# Patient Record
Sex: FEMALE | Race: WHITE | NOT HISPANIC OR LATINO | Employment: OTHER | URBAN - METROPOLITAN AREA
[De-identification: names, ages, dates, MRNs, and addresses within clinical notes are randomized per-mention and may not be internally consistent; named-entity substitution may affect disease eponyms.]

---

## 2018-09-10 PROBLEM — H81.10 BENIGN POSITIONAL VERTIGO: Status: ACTIVE | Noted: 2018-09-10

## 2020-03-11 ENCOUNTER — HOSPITAL ENCOUNTER (EMERGENCY)
Facility: HOSPITAL | Age: 85
Discharge: HOME | End: 2020-03-11
Attending: NEUROLOGICAL SURGERY | Admitting: NEUROLOGICAL SURGERY
Payer: MEDICARE

## 2020-03-11 VITALS
HEART RATE: 67 BPM | TEMPERATURE: 98 F | SYSTOLIC BLOOD PRESSURE: 135 MMHG | WEIGHT: 175 LBS | OXYGEN SATURATION: 100 % | BODY MASS INDEX: 34.36 KG/M2 | DIASTOLIC BLOOD PRESSURE: 81 MMHG | HEIGHT: 60 IN | RESPIRATION RATE: 20 BRPM

## 2020-03-11 DIAGNOSIS — S09.90XA TRAUMATIC INJURY OF HEAD, INITIAL ENCOUNTER: ICD-10-CM

## 2020-03-11 DIAGNOSIS — I62.00 SUBDURAL HEMORRHAGE: Primary | ICD-10-CM
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
## 2020-03-11 PROBLEM — S06.5XAA SUBDURAL HEMATOMA: Status: ACTIVE | Noted: 2020-03-11

## 2020-03-11 LAB
ABO + RH BLD: NORMAL
BLD GP AB SCN CELLS X3 SERPL QL: NORMAL

## 2020-03-11 PROCEDURE — 99285 PR EMERGENCY DEPT VISIT,LEVEL V: ICD-10-PCS | Mod: ,,, | Performed by: PHYSICIAN ASSISTANT

## 2020-03-11 PROCEDURE — 99284 EMERGENCY DEPT VISIT MOD MDM: CPT | Mod: 25

## 2020-03-11 PROCEDURE — 99285 EMERGENCY DEPT VISIT HI MDM: CPT | Mod: ,,, | Performed by: PHYSICIAN ASSISTANT

## 2020-03-11 PROCEDURE — 99283 PR EMERGENCY DEPT VISIT,LEVEL III: ICD-10-PCS | Mod: GC,,, | Performed by: NEUROLOGICAL SURGERY

## 2020-03-11 PROCEDURE — 86900 BLOOD TYPING SEROLOGIC ABO: CPT

## 2020-03-11 PROCEDURE — 99283 EMERGENCY DEPT VISIT LOW MDM: CPT | Mod: GC,,, | Performed by: NEUROLOGICAL SURGERY

## 2020-03-11 RX ORDER — HYDROCODONE BITARTRATE AND ACETAMINOPHEN 500; 5 MG/1; MG/1
TABLET ORAL
Status: DISCONTINUED | OUTPATIENT
Start: 2020-03-11 | End: 2020-03-11

## 2020-03-11 NOTE — ED NOTES
Pt left ED ambulatory and in stable condition. VSS. GCS 15. No s/s distress noted. Pt denies any pain or c/o at this time. Pt had all belongings and copy D/C instruction sheet . Pt accompanied by spouse and daughter

## 2020-03-11 NOTE — DISCHARGE INSTRUCTIONS
STOP Plavix.  See Neurosurgery in clinic in 2 weeks.    Our goal in the emergency department is to always give you outstanding care and exceptional service. You may receive a survey by mail or e-mail in the next week regarding your experience in our ED. We would greatly appreciate your completing and returning the survey. Your feedback provides us with a way to recognize our staff who give very good care and it helps us learn how to improve when your experience was below our aspiration of excellence.

## 2020-03-11 NOTE — SUBJECTIVE & OBJECTIVE
(Not in a hospital admission)    Review of patient's allergies indicates:   Allergen Reactions    Acetaminophen     Cephalosporins     Morphine     Penicillins     Percocet [oxycodone-acetaminophen]     Sulfa (sulfonamide antibiotics)     Tetracyclines     Trimethoprim        Past Medical History:   Diagnosis Date    Anxiety     CVA (cerebral vascular accident)     Hypertension      No past surgical history on file.  Family History     None        Tobacco Use    Smoking status: Not on file   Substance and Sexual Activity    Alcohol use: Not on file    Drug use: Not on file    Sexual activity: Not on file     Review of Systems  Objective:     Weight: 79.4 kg (175 lb)  Body mass index is 34.18 kg/m².  Vital Signs (Most Recent):  Temp: 97.5 °F (36.4 °C) (03/11/20 0349)  Pulse: 60 (03/11/20 0802)  Resp: 20 (03/11/20 0802)  BP: 127/60 (03/11/20 0802)  SpO2: 97 % (03/11/20 0802) Vital Signs (24h Range):  Temp:  [97.5 °F (36.4 °C)-98.5 °F (36.9 °C)] 97.5 °F (36.4 °C)  Pulse:  [58-83] 60  Resp:  [10-22] 20  SpO2:  [93 %-100 %] 97 %  BP: (115-204)/(57-97) 127/60                          Neurosurgery Physical Exam     E4 V5 M6  AOx3, mild expressive aphasia/word finding difficulties.  Able to carry a conversation with little difficulty.  PERRL, EOMI, face symmetric, facial sensation intact, tongue midline, shoulder shrug intact, palate rise symmetric  Extremities:  5/5 strength in all extremities in all muscle groups except for some pain limitation in the knee of the RLE  no pronator drift.  Some numbness in the RUE leftover from prev stroke        Significant Labs:  Recent Labs   Lab 03/11/20 0146   GLU 98      K 3.7      CO2 25   BUN 19*   CREATININE 0.80   CALCIUM 9.9     Recent Labs   Lab 03/11/20 0146   WBC 6.90   HGB 14.1   HCT 41.8        Recent Labs   Lab 03/11/20 0146   LABPT 14.6   INR 1.2*   APTT 30.8     Microbiology Results (last 7 days)     ** No results found for the last  168 hours. **        All pertinent labs from the last 24 hours have been reviewed.    Significant Diagnostics:  CT: Ct Head Without Contrast    Result Date: 3/11/2020  Redemonstration of a trace thin subdural hematoma along the left parafalcine anterior frontal lobe.  No significant mass effect or midline shift. No new hemorrhage elsewhere. No acute intraparenchymal hemorrhage or major vascular distribution infarction. Electronically signed by resident: Venancio Hernandes MD Date:    03/11/2020 Time:    04:44 Electronically signed by: Jorge Canada MD Date:    03/11/2020 Time:    04:59    Ct Head Without Contrast    Result Date: 3/10/2020  1. Evidence of a trace amount of subdural hemorrhage in the left para falcine frontal region without associated midline shift or mass effect.  No intraparenchymal hemorrhage identified.  Findings discussed with Dr. Felton at the time of interpretation. 2. High left posterior parietal scalp subgaleal hematoma.  Underlying calvarium is intact. Electronically signed by: Peter Alvarado MD Date:    03/10/2020 Time:    23:58  MRI: No results found in the last 24 hours.

## 2020-03-11 NOTE — ASSESSMENT & PLAN NOTE
83 yo woman with pmh sig for L CVA on plavix with residual mild expressive aphasia at baseline, presented to an outside hospital after a mechanical fall.  No LOC, no change in neuro exam.  CT demonstrated a small vol left sided parafalcine SDH with no significant mass effect.  Remains at neurological baseline.    -no neurosurgical intervention indicated at this time  -bleed stable on rpt imaging  -ok to discharge to home  -please have patient follow up in clinic with Dr. Neri in 2 weeks.  Hold plavix until follow up.  -Decision whether to restart plavix will be made at that time.    -will likely need social work assistance in getting back home, as  rode in ambulance with patient.

## 2020-03-11 NOTE — ED NOTES
Patient turned over to me by Dr. Jones.  Small SDH.  Pending final NS recs.  D/W NS.  Recommend DC, stop plavix, and 2 week f/up.  Evaluated at bedside.  Patient stable and ambulatory.  No acute distress or AMS.  Wants to go home.  Discharged with NS f/up.  Did bedside teaching.  All questions answered.  Patient acknowledges understanding.     Kiko Carlisle MD  03/11/20 8039

## 2020-03-11 NOTE — ED TRIAGE NOTES
Aranza Briceño, a 84 y.o. female presents to the ED w/ complaint of SDH    Triage note: Pt presents via EMS for a consult with neuro for a SDH after having a trip and fall at home and hitting head on wall. Pt taking blood thinners. Pt awake, alert, and oriented. Pt given labetalol at [facility].  Chief Complaint   Patient presents with    Transfer     SDH from [place], AAOx4     Review of patient's allergies indicates:   Allergen Reactions    Acetaminophen     Cephalosporins     Morphine     Penicillins     Percocet [oxycodone-acetaminophen]     Sulfa (sulfonamide antibiotics)     Tetracyclines     Trimethoprim      Past Medical History:   Diagnosis Date    Anxiety     CVA (cerebral vascular accident)     Hypertension      Patient Identifiers for Aranza Briceño checked and correct  LOC: The patient is awake, alert and aware of environment with an appropriate affect, the patient is oriented x 3 and speaking appropriate.  APPEARANCE: Patient resting comfortably and in no acute distress, patient is clean and well groomed, patient's clothing is properly fastened.  SKIN: The skin is warm and dry, patient has normal skin turgor and moist mucus membranes,no rashes or lesions. Pt has hematoma to back of head  Musculoskeletal :  Normal range of motion noted. Moves all extremeties well, No swelling or tenderness noted  RESPIRATORY: Airway is open and patent, respirations are spontaneous, patient has a normal effort and rate.  CARDIAC: Patient has a normal rate and rhythm, no periphreal edema noted, capillary refill < 3 seconds.   ABDOMEN: Soft and non tender to palpation, no distention noted.   PULSES: 2+  And symmetrical in all extremeties  NEUROLOGIC: PERRL,facial expression is symmetrical, patient moving all extremities, normal sensation in all extremities when touched with a finger.The patient is awake, alert and cooperative with a calm affect, patient is aware of environment.    Will continue to  monitor

## 2020-03-11 NOTE — HPI
83 yo woman with pmh sig for L CVA on plavix with residual mild expressive aphasia at baseline, presented to an outside hospital after a mechanical fall, tripping over her slippers.  No LOC, no change in neuro exam.  CT demonstrated a small vol left sided parafalcine SDH with no significant mass effect. SBP was elevated over 170 at the OSH, plts 152, INR 1.2.  Repeat head CT was obtained 6 hrs after the original, once she arrived at the ED here.  Remains at neurological baseline.

## 2020-03-11 NOTE — ED PROVIDER NOTES
Encounter Date: 3/11/2020      SCRIBE #2 NOTE: I, am scribing for, and in the presence of. I have scribed the following portions of the note -     History     Chief Complaint   Patient presents with    Transfer     SDH from [place], AAOx4     84 year old female with medical history of CVA on Plavix, HTN presenting to the ED via transfer from [facility] for evaluation of SDH. Patient experienced mechanical fall and subsequent head trauma earlier today. She had CT head at [facility] ED that showed left para falcine frontal subdural hemorrhage measuring 1.2 x 0.2 cm. Patient's BP was elevated and required Labetalol IV prior to transfer. She was transferred for NSG evaluation. Patient complaining of mild headache at the time of my exam. She denies vision changes, speech changes, numbness, paresthesias, extremity weakness. Patient has chronic difficulty initiating speech from her prior stroke. She ambulates without assistive devices. Patient's  at bedside and states she is at her baseline mental status.          Review of patient's allergies indicates:   Allergen Reactions    Acetaminophen     Cephalosporins     Morphine     Penicillins     Percocet [oxycodone-acetaminophen]     Sulfa (sulfonamide antibiotics)     Tetracyclines     Trimethoprim      Past Medical History:   Diagnosis Date    Anxiety     CVA (cerebral vascular accident)     Hypertension      No past surgical history on file.  No family history on file.  Social History     Tobacco Use    Smoking status: Not on file   Substance Use Topics    Alcohol use: Not on file    Drug use: Not on file     Review of Systems   Constitutional: Negative for chills, diaphoresis and fever.   HENT: Negative for sore throat.    Eyes: Negative for pain and redness.   Respiratory: Negative for shortness of breath.    Cardiovascular: Negative for chest pain.   Gastrointestinal: Negative for nausea.   Genitourinary: Negative for dysuria.   Musculoskeletal:  Negative for back pain.   Skin: Negative for rash.   Neurological: Positive for headaches. Negative for weakness.   Hematological: Does not bruise/bleed easily.       Physical Exam     Initial Vitals [03/11/20 0349]   BP Pulse Resp Temp SpO2   (!) 140/69 (!) 58 16 97.5 °F (36.4 °C) 97 %      MAP       --         Physical Exam    Constitutional: She appears well-developed and well-nourished. She is not diaphoretic. No distress.   HENT:   Head: Normocephalic.   Mouth/Throat: Oropharynx is clear and moist. No oropharyngeal exudate.   Hematoma to L occiput   Eyes: EOM are normal. Pupils are equal, round, and reactive to light.   Neck: Normal range of motion. Neck supple.   Cardiovascular: Normal rate and regular rhythm.   Pulmonary/Chest: Breath sounds normal. No respiratory distress. She has no wheezes.   Abdominal: Soft. She exhibits no distension. There is no tenderness.   Musculoskeletal: Normal range of motion. She exhibits no edema or tenderness.   Neurological: She is alert and oriented to person, place, and time. She has normal strength.   Slight delay in initiating speech (baseline per patient and family). Follows commands appropriately. No dysmetria. Negative pronator drift.    Skin: Skin is warm and dry. No rash noted. No erythema.         ED Course   Procedures  Labs Reviewed - No data to display       Imaging Results    None          Medical Decision Making:   History:   Old Medical Records: I decided to obtain old medical records.  Old Records Summarized: records from clinic visits.  Clinical Tests:   Lab Tests: Reviewed  Radiological Study: Reviewed  Medical Tests: Reviewed  Other:   I have discussed this case with another health care provider.       <> Summary of the Discussion: JAMISON       APC / Resident Notes:   84 year old female with medical history of CVA on Plavix, HTN presenting to the ED via transfer from [facility] for evaluation of SDH. Patient required Labetalol IV prior to transfer. BP  140/69 on ED arrival. Discussed with NSG on arrival and they will come see the patient. Will repeat 6 hour CT head post initial now. NSG recommending administering platelets due to plavix and INR of 1.2. Blood consent obtained and 1 unit platelets ordered.      5:10 AM - Joseph Mccormack PA-C  Patient signed out to my colleague at the end of my shift with instructions to follow-up repeat CT head and NSG recommendations.            Attending Attestation:     Physician Attestation Statement for NP/PA:   I discussed this assessment and plan of this patient with the NP/PA, but I did not personally examine the patient. The face to face encounter was performed by the NP/PA.                                Clinical Impression:       ICD-10-CM ICD-9-CM   1. Subdural hemorrhage I62.00 432.1   2. Traumatic injury of head, initial encounter S09.90XA 959.01   3. Fall, initial encounter W19.XXXA E888.9                                Joseph Mccormack PA-C  03/11/20 0511       Kerry Jones MD  03/14/20 [id number]

## 2020-03-12 ENCOUNTER — TELEPHONE (OUTPATIENT)
Dept: NEUROSURGERY | Facility: CLINIC | Age: 85
End: 2020-03-12

## 2020-11-02 PROBLEM — R10.13 DISTRESS, EPIGASTRIC: Status: ACTIVE | Noted: 2020-11-02

## 2021-02-04 ENCOUNTER — DOCUMENT SCAN (OUTPATIENT)
Dept: HOME HEALTH SERVICES | Facility: HOSPITAL | Age: 86
End: 2021-02-04

## 2021-02-12 ENCOUNTER — DOCUMENT SCAN (OUTPATIENT)
Dept: HOME HEALTH SERVICES | Facility: HOSPITAL | Age: 86
End: 2021-02-12

## 2021-03-05 ENCOUNTER — EXTERNAL HOME HEALTH (OUTPATIENT)
Dept: HOME HEALTH SERVICES | Facility: HOSPITAL | Age: 86
End: 2021-03-05

## 2021-03-22 ENCOUNTER — DOCUMENT SCAN (OUTPATIENT)
Dept: HOME HEALTH SERVICES | Facility: HOSPITAL | Age: 86
End: 2021-03-22

## 2021-03-26 ENCOUNTER — EXTERNAL HOME HEALTH (OUTPATIENT)
Dept: HOME HEALTH SERVICES | Facility: HOSPITAL | Age: 86
End: 2021-03-26

## 2021-04-16 ENCOUNTER — DOCUMENT SCAN (OUTPATIENT)
Dept: HOME HEALTH SERVICES | Facility: HOSPITAL | Age: 86
End: 2021-04-16

## 2021-04-28 ENCOUNTER — DOCUMENT SCAN (OUTPATIENT)
Dept: HOME HEALTH SERVICES | Facility: HOSPITAL | Age: 86
End: 2021-04-28

## 2021-05-06 ENCOUNTER — PATIENT MESSAGE (OUTPATIENT)
Dept: RESEARCH | Facility: HOSPITAL | Age: 86
End: 2021-05-06

## 2021-05-21 ENCOUNTER — EXTERNAL HOME HEALTH (OUTPATIENT)
Dept: HOME HEALTH SERVICES | Facility: HOSPITAL | Age: 86
End: 2021-05-21

## 2021-07-01 ENCOUNTER — PATIENT MESSAGE (OUTPATIENT)
Dept: ADMINISTRATIVE | Facility: OTHER | Age: 86
End: 2021-07-01

## 2021-07-22 ENCOUNTER — EXTERNAL HOME HEALTH (OUTPATIENT)
Dept: HOME HEALTH SERVICES | Facility: HOSPITAL | Age: 86
End: 2021-07-22

## 2021-08-16 ENCOUNTER — DOCUMENT SCAN (OUTPATIENT)
Dept: HOME HEALTH SERVICES | Facility: HOSPITAL | Age: 86
End: 2021-08-16

## 2021-09-30 ENCOUNTER — DOCUMENT SCAN (OUTPATIENT)
Dept: HOME HEALTH SERVICES | Facility: HOSPITAL | Age: 86
End: 2021-09-30

## 2021-10-18 ENCOUNTER — EXTERNAL HOME HEALTH (OUTPATIENT)
Dept: HOME HEALTH SERVICES | Facility: HOSPITAL | Age: 86
End: 2021-10-18
Payer: MEDICARE

## 2021-10-19 ENCOUNTER — DOCUMENT SCAN (OUTPATIENT)
Dept: HOME HEALTH SERVICES | Facility: HOSPITAL | Age: 86
End: 2021-10-19

## 2021-11-18 ENCOUNTER — EXTERNAL HOME HEALTH (OUTPATIENT)
Dept: HOME HEALTH SERVICES | Facility: HOSPITAL | Age: 86
End: 2021-11-18
Payer: MEDICARE

## 2021-11-29 ENCOUNTER — DOCUMENT SCAN (OUTPATIENT)
Dept: HOME HEALTH SERVICES | Facility: HOSPITAL | Age: 86
End: 2021-11-29
Payer: MEDICARE

## 2021-12-13 ENCOUNTER — DOCUMENT SCAN (OUTPATIENT)
Dept: HOME HEALTH SERVICES | Facility: HOSPITAL | Age: 86
End: 2021-12-13
Payer: MEDICARE

## 2021-12-17 ENCOUNTER — DOCUMENT SCAN (OUTPATIENT)
Dept: HOME HEALTH SERVICES | Facility: HOSPITAL | Age: 86
End: 2021-12-17
Payer: MEDICARE

## 2022-01-20 ENCOUNTER — EXTERNAL HOME HEALTH (OUTPATIENT)
Dept: HOME HEALTH SERVICES | Facility: HOSPITAL | Age: 87
End: 2022-01-20
Payer: MEDICARE

## 2022-02-08 ENCOUNTER — DOCUMENT SCAN (OUTPATIENT)
Dept: HOME HEALTH SERVICES | Facility: HOSPITAL | Age: 87
End: 2022-02-08
Payer: MEDICARE

## 2022-03-31 ENCOUNTER — EXTERNAL HOME HEALTH (OUTPATIENT)
Dept: HOME HEALTH SERVICES | Facility: HOSPITAL | Age: 87
End: 2022-03-31
Payer: MEDICARE

## 2022-04-19 ENCOUNTER — TELEPHONE (OUTPATIENT)
Dept: NEUROLOGY | Facility: CLINIC | Age: 87
End: 2022-04-19
Payer: MEDICARE

## 2022-04-19 NOTE — TELEPHONE ENCOUNTER
----- Message from Ninfa Lopez sent at 4/18/2022  4:54 PM CDT -----  Pt suffers from  memory and dementia looking to  be seen sooner than July     Pt Daughter Nory Conley - [phone]

## 2022-04-19 NOTE — TELEPHONE ENCOUNTER
Called and spoke with pts dtr Nory Conley. Explained that we have 2 physicians who specialize in Memory disorders. She stated Dr. Miller and Dr. Hatfield were not names she was given. Explained she may have been referred to psychiatry. We would need a referral to neurology for memory disorder. Nory verbalized understanding.

## 2022-07-05 NOTE — CONSULTS
[facility]  Neurosurgery  Consult Note    Consults  Subjective:     Chief Complaint/Reason for Admission: Left parafalcine SDH    History of Present Illness: 85 yo woman with pmh sig for L CVA on plavix with residual mild expressive aphasia at baseline, presented to an outside hospital after a mechanical fall, tripping over her slippers.  No LOC, no change in neuro exam.  CT demonstrated a small vol left sided parafalcine SDH with no significant mass effect. SBP was elevated over 170 at the OSH, plts 152, INR 1.2.  Repeat head CT was obtained 6 hrs after the original, once she arrived at the ED here.  Remains at neurological baseline.      (Not in a hospital admission)    Review of patient's allergies indicates:   Allergen Reactions    Acetaminophen     Cephalosporins     Morphine     Penicillins     Percocet [oxycodone-acetaminophen]     Sulfa (sulfonamide antibiotics)     Tetracyclines     Trimethoprim        Past Medical History:   Diagnosis Date    Anxiety     CVA (cerebral vascular accident)     Hypertension      No past surgical history on file.  Family History     None        Tobacco Use    Smoking status: Not on file   Substance and Sexual Activity    Alcohol use: Not on file    Drug use: Not on file    Sexual activity: Not on file     Review of Systems  Objective:     Weight: 79.4 kg (175 lb)  Body mass index is 34.18 kg/m².  Vital Signs (Most Recent):  Temp: 97.5 °F (36.4 °C) (03/11/20 0349)  Pulse: 60 (03/11/20 0802)  Resp: 20 (03/11/20 0802)  BP: 127/60 (03/11/20 0802)  SpO2: 97 % (03/11/20 0802) Vital Signs (24h Range):  Temp:  [97.5 °F (36.4 °C)-98.5 °F (36.9 °C)] 97.5 °F (36.4 °C)  Pulse:  [58-83] 60  Resp:  [10-22] 20  SpO2:  [93 %-100 %] 97 %  BP: (115-204)/(57-97) 127/60                          Neurosurgery Physical Exam     E4 V5 M6  AOx3, mild expressive aphasia/word finding difficulties.  Able to carry a conversation with little difficulty.  PERRL, EOMI, face  symmetric, facial sensation intact, tongue midline, shoulder shrug intact, palate rise symmetric  Extremities:  5/5 strength in all extremities in all muscle groups except for some pain limitation in the knee of the RLE  no pronator drift.  Some numbness in the RUE leftover from prev stroke        Significant Labs:  Recent Labs   Lab 03/11/20 0146   GLU 98      K 3.7      CO2 25   BUN 19*   CREATININE 0.80   CALCIUM 9.9     Recent Labs   Lab 03/11/20 0146   WBC 6.90   HGB 14.1   HCT 41.8        Recent Labs   Lab 03/11/20 0146   LABPT 14.6   INR 1.2*   APTT 30.8     Microbiology Results (last 7 days)     ** No results found for the last 168 hours. **        All pertinent labs from the last 24 hours have been reviewed.    Significant Diagnostics:  CT: Ct Head Without Contrast    Result Date: 3/11/2020  Redemonstration of a trace thin subdural hematoma along the left parafalcine anterior frontal lobe.  No significant mass effect or midline shift. No new hemorrhage elsewhere. No acute intraparenchymal hemorrhage or major vascular distribution infarction. Electronically signed by resident: Venancio Hernandes MD Date:    03/11/2020 Time:    04:44 Electronically signed by: Jorge Canada MD Date:    03/11/2020 Time:    04:59    Ct Head Without Contrast    Result Date: 3/10/2020  1. Evidence of a trace amount of subdural hemorrhage in the left para falcine frontal region without associated midline shift or mass effect.  No intraparenchymal hemorrhage identified.  Findings discussed with Dr. Felton at the time of interpretation. 2. High left posterior parietal scalp subgaleal hematoma.  Underlying calvarium is intact. Electronically signed by: Peter Alvarado MD Date:    03/10/2020 Time:    23:58  MRI: No results found in the last 24 hours.    Assessment/Plan:     * Subdural hematoma  85 yo woman with pmh sig for L CVA on plavix with residual mild expressive aphasia at baseline, presented to an outside hospital  after a mechanical fall.  No LOC, no change in neuro exam.  CT demonstrated a small vol left sided parafalcine SDH with no significant mass effect.  Remains at neurological baseline.    -no neurosurgical intervention indicated at this time  -bleed stable on rpt imaging  -ok to discharge to home  -please have patient follow up in clinic with Dr. Neri in 2 weeks.  Hold plavix until follow up.  -Decision whether to restart plavix will be made at that time.    -will likely need social work assistance in getting back home, as  rode in ambulance with patient.        Thank you for your consult. I will sign off. Please contact us if you have any additional questions.    Landon Lee MD  Neurosurgery  [facility]   Yes

## 2022-08-16 ENCOUNTER — LAB VISIT (OUTPATIENT)
Dept: LAB | Facility: HOSPITAL | Age: 87
End: 2022-08-16
Payer: MEDICARE

## 2022-08-16 ENCOUNTER — OFFICE VISIT (OUTPATIENT)
Dept: PSYCHIATRY | Facility: CLINIC | Age: 87
End: 2022-08-16
Payer: MEDICARE

## 2022-08-16 VITALS
DIASTOLIC BLOOD PRESSURE: 62 MMHG | HEART RATE: 71 BPM | SYSTOLIC BLOOD PRESSURE: 131 MMHG | WEIGHT: 107.13 LBS | BODY MASS INDEX: 20.24 KG/M2

## 2022-08-16 DIAGNOSIS — F03.91 DEMENTIA WITH BEHAVIORAL DISTURBANCE, UNSPECIFIED DEMENTIA TYPE: ICD-10-CM

## 2022-08-16 DIAGNOSIS — E55.9 VITAMIN D DEFICIENCY, UNSPECIFIED: ICD-10-CM

## 2022-08-16 DIAGNOSIS — F32.A DEPRESSION, UNSPECIFIED: ICD-10-CM

## 2022-08-16 DIAGNOSIS — F03.91 DEMENTIA WITH BEHAVIORAL DISTURBANCE, UNSPECIFIED DEMENTIA TYPE: Primary | ICD-10-CM

## 2022-08-16 LAB
ALBUMIN SERPL BCP-MCNC: 4 G/DL (ref 3.5–5.2)
ALP SERPL-CCNC: 84 U/L (ref 55–135)
ALT SERPL W/O P-5'-P-CCNC: 12 U/L (ref 10–44)
ANION GAP SERPL CALC-SCNC: 7 MMOL/L (ref 8–16)
AST SERPL-CCNC: 10 U/L (ref 10–40)
BASOPHILS # BLD AUTO: 0.02 K/UL (ref 0–0.2)
BASOPHILS NFR BLD: 0.4 % (ref 0–1.9)
BILIRUB SERPL-MCNC: 0.4 MG/DL (ref 0.1–1)
BUN SERPL-MCNC: 23 MG/DL (ref 8–23)
CALCIUM SERPL-MCNC: 9.8 MG/DL (ref 8.7–10.5)
CHLORIDE SERPL-SCNC: 101 MMOL/L (ref 95–110)
CO2 SERPL-SCNC: 27 MMOL/L (ref 23–29)
CREAT SERPL-MCNC: 0.8 MG/DL (ref 0.5–1.4)
DIFFERENTIAL METHOD: ABNORMAL
EOSINOPHIL # BLD AUTO: 0.1 K/UL (ref 0–0.5)
EOSINOPHIL NFR BLD: 1.1 % (ref 0–8)
ERYTHROCYTE [DISTWIDTH] IN BLOOD BY AUTOMATED COUNT: 12.3 % (ref 11.5–14.5)
EST. GFR  (NO RACE VARIABLE): >60 ML/MIN/1.73 M^2
FOLATE SERPL-MCNC: >40 NG/ML (ref 4–24)
GLUCOSE SERPL-MCNC: 110 MG/DL (ref 70–110)
HCT VFR BLD AUTO: 40.7 % (ref 37–48.5)
HCYS SERPL-SCNC: 5.5 UMOL/L (ref 4–15.5)
HGB BLD-MCNC: 13.9 G/DL (ref 12–16)
IMM GRANULOCYTES # BLD AUTO: 0.01 K/UL (ref 0–0.04)
IMM GRANULOCYTES NFR BLD AUTO: 0.2 % (ref 0–0.5)
LYMPHOCYTES # BLD AUTO: 1.2 K/UL (ref 1–4.8)
LYMPHOCYTES NFR BLD: 22.3 % (ref 18–48)
MCH RBC QN AUTO: 31.6 PG (ref 27–31)
MCHC RBC AUTO-ENTMCNC: 34.2 G/DL (ref 32–36)
MCV RBC AUTO: 93 FL (ref 82–98)
MONOCYTES # BLD AUTO: 0.4 K/UL (ref 0.3–1)
MONOCYTES NFR BLD: 6.4 % (ref 4–15)
NEUTROPHILS # BLD AUTO: 3.8 K/UL (ref 1.8–7.7)
NEUTROPHILS NFR BLD: 69.6 % (ref 38–73)
NRBC BLD-RTO: 0 /100 WBC
PLATELET # BLD AUTO: 145 K/UL (ref 150–450)
PMV BLD AUTO: 9 FL (ref 9.2–12.9)
POTASSIUM SERPL-SCNC: 4.2 MMOL/L (ref 3.5–5.1)
PROT SERPL-MCNC: 6.4 G/DL (ref 6–8.4)
RBC # BLD AUTO: 4.4 M/UL (ref 4–5.4)
SODIUM SERPL-SCNC: 135 MMOL/L (ref 136–145)
T3 SERPL-MCNC: 63 NG/DL (ref 60–180)
T4 FREE SERPL-MCNC: 0.95 NG/DL (ref 0.71–1.51)
TSH SERPL DL<=0.005 MIU/L-ACNC: 1.29 UIU/ML (ref 0.4–4)
VIT B12 SERPL-MCNC: 415 PG/ML (ref 210–950)
WBC # BLD AUTO: 5.43 K/UL (ref 3.9–12.7)

## 2022-08-16 PROCEDURE — 99999 PR PBB SHADOW E&M-EST. PATIENT-LVL II: CPT | Mod: PBBFAC,,,

## 2022-08-16 PROCEDURE — 82607 VITAMIN B-12: CPT | Performed by: STUDENT IN AN ORGANIZED HEALTH CARE EDUCATION/TRAINING PROGRAM

## 2022-08-16 PROCEDURE — 83090 ASSAY OF HOMOCYSTEINE: CPT | Performed by: STUDENT IN AN ORGANIZED HEALTH CARE EDUCATION/TRAINING PROGRAM

## 2022-08-16 PROCEDURE — 36415 COLL VENOUS BLD VENIPUNCTURE: CPT | Performed by: STUDENT IN AN ORGANIZED HEALTH CARE EDUCATION/TRAINING PROGRAM

## 2022-08-16 PROCEDURE — 85025 COMPLETE CBC W/AUTO DIFF WBC: CPT | Performed by: STUDENT IN AN ORGANIZED HEALTH CARE EDUCATION/TRAINING PROGRAM

## 2022-08-16 PROCEDURE — 84443 ASSAY THYROID STIM HORMONE: CPT | Performed by: STUDENT IN AN ORGANIZED HEALTH CARE EDUCATION/TRAINING PROGRAM

## 2022-08-16 PROCEDURE — 99212 OFFICE O/P EST SF 10 MIN: CPT | Mod: PBBFAC

## 2022-08-16 PROCEDURE — 99999 PR PBB SHADOW E&M-EST. PATIENT-LVL II: ICD-10-PCS | Mod: PBBFAC,,,

## 2022-08-16 PROCEDURE — 84439 ASSAY OF FREE THYROXINE: CPT | Performed by: STUDENT IN AN ORGANIZED HEALTH CARE EDUCATION/TRAINING PROGRAM

## 2022-08-16 PROCEDURE — 82746 ASSAY OF FOLIC ACID SERUM: CPT | Performed by: STUDENT IN AN ORGANIZED HEALTH CARE EDUCATION/TRAINING PROGRAM

## 2022-08-16 PROCEDURE — 99205 PR OFFICE/OUTPT VISIT, NEW, LEVL V, 60-74 MIN: ICD-10-PCS | Mod: S$PBB,,, | Performed by: PSYCHIATRY & NEUROLOGY

## 2022-08-16 PROCEDURE — 83921 ORGANIC ACID SINGLE QUANT: CPT | Performed by: STUDENT IN AN ORGANIZED HEALTH CARE EDUCATION/TRAINING PROGRAM

## 2022-08-16 PROCEDURE — 84480 ASSAY TRIIODOTHYRONINE (T3): CPT | Performed by: STUDENT IN AN ORGANIZED HEALTH CARE EDUCATION/TRAINING PROGRAM

## 2022-08-16 PROCEDURE — 80053 COMPREHEN METABOLIC PANEL: CPT | Performed by: STUDENT IN AN ORGANIZED HEALTH CARE EDUCATION/TRAINING PROGRAM

## 2022-08-16 PROCEDURE — 99205 OFFICE O/P NEW HI 60 MIN: CPT | Mod: S$PBB,,, | Performed by: PSYCHIATRY & NEUROLOGY

## 2022-08-16 PROCEDURE — 82652 VIT D 1 25-DIHYDROXY: CPT | Performed by: STUDENT IN AN ORGANIZED HEALTH CARE EDUCATION/TRAINING PROGRAM

## 2022-08-16 PROCEDURE — 84425 ASSAY OF VITAMIN B-1: CPT | Performed by: STUDENT IN AN ORGANIZED HEALTH CARE EDUCATION/TRAINING PROGRAM

## 2022-08-16 RX ORDER — ZIPRASIDONE HYDROCHLORIDE 20 MG/1
20 CAPSULE ORAL NIGHTLY
Qty: 30 CAPSULE | Refills: 1 | Status: SHIPPED | OUTPATIENT
Start: 2022-08-16 | End: 2022-09-28 | Stop reason: SDUPTHER

## 2022-08-16 RX ORDER — ESCITALOPRAM OXALATE 10 MG/1
10 TABLET ORAL DAILY
Qty: 30 TABLET | Refills: 1 | Status: SHIPPED | OUTPATIENT
Start: 2022-08-16 | End: 2022-09-28

## 2022-08-16 RX ORDER — TRAZODONE HYDROCHLORIDE 50 MG/1
100 TABLET ORAL NIGHTLY
Qty: 60 TABLET | Refills: 1 | Status: SHIPPED | OUTPATIENT
Start: 2022-08-16 | End: 2022-09-28 | Stop reason: SDUPTHER

## 2022-08-16 RX ORDER — TOPIRAMATE 25 MG/1
25 TABLET ORAL NIGHTLY
Qty: 30 TABLET | Refills: 1 | Status: SHIPPED | OUTPATIENT
Start: 2022-08-16 | End: 2022-09-28 | Stop reason: SDUPTHER

## 2022-08-16 NOTE — PROGRESS NOTES
"OUTPATIENT PSYCHIATRY INITIAL VISIT    ENCOUNTER DATE:  8/16/2022  SITE:  [facility], [facility]  REFFERAL SOURCE:  Murali Muniz MD  LENGTH OF SESSION:  60 minutes        CHIEF COMPLAINT:   Dementia with worsening anxiety    HISTORY OF PRESENTING ILLNESS:  Aranza Briceño is a 87 y.o. 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    Patient has been following Neurology with Dr. Burton for ~1-2 years. Per family and chart review, prior to 3/2020 pt was at her baseline (able to tend to most ADLs, no depression or anxiety, participated in many hobbies such as puzzles, cooking; however, mild word-finding difficulties). Family notes a drastic decline in mentation occurred in 3/2020 where she began to experience anxiety, express paranoid delusions (rumination and worry about her  having an affair) and visual hallucinations (shapes on walls, the devil). Pt also was noted to have new onset "manic behavior" which occurred after her CVA in 2016 for which Dr. Muniz started her on Topamax 50 mg daily, but worsened 3/2022. At this time, pt was found to have UTI and was appropriately treated with antibiotics which resolved her paranoid delusions and VHs, however her anxiety, insomnia, restlessness persisted. Of note, MRI on 1/13/21 showed diffuse volume loss and white matter disease with small subacute to acute infarct, as well as 2 incidental calcified meningiomas.     Pt was started on Trazodone, Geodon, Klonopin, Effexor XL and was continued on Topamax in 3/2020 with little improvement. Pt also tried Memantine and Aricept which showed no improvement in symptoms and was intolerable to pt. In 2021, Topamax was discontinued around the time of [place] since there was suspicion that it was causing a decrease in appetite, however family states that once this was discontinued, the patient had an abrupt increase in her " anxiety that caused her to not participate in her usual hobbies or tend to her ADLs. Topamax was then restarted at a lower dose of 25 mg daily with some improvement in anxiety. Per family, pt was started on Wellbutrin 150 BID to assist in anxiety management with some observed improvement.     Family reports that patient is sleeping well on Trazodone with little daytime sedation now that her daytime dose has decreased to 25 in AM, 25 in Afternoon. Although the anxiety has improved somewhat, this remains their priority and requested referral to psychiatry specifically for its management as it affects her daily functioning. Family states she is anxious from when she awakens until bedtime, and that it significantly limits her ability to get out of the house and participate in her usual activities. Family endorses significant decreased appetite at onset of mentation change in 2020 with ~100 lb weight loss since that time. Pt continues to have decreased appetite, however family makes an effort to make sure she consumes enough calories daily which has resulted in weight stabilization.     Pt currently lives with  and son with multiple of her children as caretakers.           History as told by Patient - & or family/friend/spouse/caregiver with pts permission  As above.       PSYCHIATRIC REVIEW OF SYMPTOMS: (Is patient experiencing or having changes in any of the following?)    Symptoms of Depression: family endorses diminished mood or loss of interest/anhedonia; irritability, diminished energy, change in sleep, change in appetite, diminished concentration or cognition or indecisiveness, PMA/R, excessive guilt or hopelessness or worthlessness, suicidal ideations - Passive/ Active denies, Self injurious behaviors- denies  Onset was approximately 2 years ago. Symptoms have been gradually worsening since that time.   Current symptoms include: anhedonia, change in sleep and appetite    Symptoms of ROSEMARIE: family endorses  excessive anxiety/worry/fear, more days than not, about numerous issues, difficult to control, with restlessness, fatigue, poor concentration, irritability, muscle tension, sleep disturbance; causes functionally impairing distress   Onset was approximately 2 years ago. Symptoms have been gradually worsening since that time.   Current symptoms include: excessive anxiety/worrying     Symptoms of Panic Attacks: denies recurrent panic attacks- Frequency/ week; Description- SOB/ palpitations/ tremulousness, numbness/ paraesthesias/ nausea/ feeling of impending doom/ derealization/ depersonalization   Panic attacks are precipitated or un-precipitated, source of worry and/or behavioral changes secondary; with or without agoraphobia    Symptoms of dede or hypomania: elevated, expansive, or irritable mood with increased energy or activity; with inflated self-esteem or grandiosity, decreased need for sleep, increased rate of speech,  racing thoughts, distractibility, increased goal directed activity or PMA, risky/disinhibited behavior  Onset was approximatelySymptoms have been since that time.   Current symptoms include:    Symptoms of psychosis: denies current hallucinations, delusions, disorganized thinking, disorganized behavior or abnormal motor behavior, or negative symptoms (diminshed emotional expression, avolition, anhedonia, alogia, asociality   Onset was approximately . Symptoms have been  since that time.   Current symptoms include:    Symptoms of PTSD: denies h/o trauma - physical abuse /sexual abuse / domestic violence/ other traumas); re-experiencing/intrusive symptoms, nightmares, avoidant behavior, negative alterations in cognition or mood, or hyperarousal symptoms; with or without dissociative symptoms     Sleep: denies current sleeping difficulties including initiation/ maintenance/ early morning awakening with inability to return to sleep/ hypnagogic or hypnaompic hallucinations    Other Psych  ROS:    Symptoms of OCD: denies obsessions, compulsions or ruminations    Symptoms of Eating Disorders: denies anorexia, bulimia or binge eating    Symptoms of ADHD: denies inattention or hyperactivity    Risk Parameters:  Patient reports no suicidal ideation  Patient reports no homicidal ideation  Patient reports no self-injurious behavior  Patient reports no violent behavior    PSYCHIATRIC MED REVIEW    Current psych meds  Medication side effects:  none  Medication compliance:  compliant    Previous psych meds trials    PAST PSYCHIATRIC HISTORY:  Previous Psychiatric Diagnoses:  Dementia, depression, anxiety  Previous Psychiatric Hospitalizations:  denies  Previous SI/HI:  denies  Previous Suicide Attempts:  denies  Previous Self injurious behaviors: denies  Previous Medication Trials:  Effexor, geodon, aricept, memantine, topamax, wellbutrin, trazodone, ativan, klonopin  Psychiatric Care (current & past):  none  History of Psychotherapy:  none  History of Violence:  none    SUBSTANCE ABUSE HISTORY:  Caffeine: none  Tobacco:  No  Alcohol:  no  Illicit Substances:  no  Misuse of Prescription Medications:  no  Other: Herbal supplements/ online supplements no    If positve history  Detoxes:  none  Rehabs:  none  12 Step Meetings:  none  Periods of Sobriety:  none  Withdrawal:  none    FAMILY HISTORY:  Psychiatric:  none  Family H/o suicide:  none    SOCIAL HISTORY:  Developmental/Childhood:Achieved all developmental milestones timely  Education:High School Diploma  Employment Status/Finances:Retired   Relationship Status/Sexual Orientation: : Relationship intact  Children: 9  Housing Status: Home    history:  NO  Access to Firearms: NO;  Locked up?  Adventist:Non-practicing  Recreational activities:Time with family    LEGAL HISTORY:   Past charges/incarcerations: No   Pending charges:No     NEUROLOGIC HISTORY:  Seizures:  no   Head trauma:  Previous CVA 2016    MEDICAL REVIEW OF SYSTEMS:  Complete  review of systems performed covering Constitutional, Cardiovascular, Respiratory, Gastrointestinal, Musculoskeletal, Skin, Neurologic and Endocrine  All systems negative except for bladder spasms.     MEDICAL HISTORY:  Past Medical History:   Diagnosis Date    Anxiety     Arthritis     DJD    CVA (cerebral vascular accident)     Depression     Digestive disorder     Hearing loss     Hypertension     IBS (irritable bowel syndrome)        ALL MEDICATIONS:    Current Outpatient Medications:     buPROPion (WELLBUTRIN SR) 150 MG TBSR 12 hr tablet, TAKE ONE TABLET BY MOUTH EVERY MORNING and TAKE ONE TABLET BY MOUTH EVERY NIGHT, Disp: , Rfl:     cholecalciferol, vitamin D3, (VITAMIN D3) 25 mcg (1,000 unit) capsule, Take 1,000 Units by mouth once daily., Disp: , Rfl:     clopidogreL (PLAVIX) 75 mg tablet, Take 75 mg by mouth every morning., Disp: , Rfl:     iron/FA/dha/epa/FAD/NADH/mv47 (ENLYTE ORAL), Take by mouth. 1 tab by mouth daily, Disp: , Rfl:     LORazepam (ATIVAN) 0.5 MG tablet, Take 1 tablet (0.5 mg total) by mouth 2 (two) times daily. (Patient taking differently: Take 0.5 mg by mouth once daily.), Disp: 30 tablet, Rfl: 5    topiramate (TOPAMAX) 25 MG tablet, Take 1 tablet (25 mg total) by mouth every evening., Disp: 30 tablet, Rfl: 11    traZODone (DESYREL) 50 MG tablet, Take 1 tablet (50 mg total) by mouth 2 (two) times daily., Disp: 60 tablet, Rfl: 11    UNABLE TO FIND, Barby TC one tablet by mouth nightly, Disp: , Rfl:     ziprasidone (GEODON) 20 MG Cap, Take 20 mg by mouth every evening. , Disp: , Rfl:   No current facility-administered medications for this visit.    Facility-Administered Medications Ordered in Other Visits:     0.9%  NaCl infusion, , Intravenous, Continuous, Doroteo Huang MD, Stopped at 11/02/20 7229    ALLERGIES:  Review of patient's allergies indicates:   Allergen Reactions    Acetaminophen     Cephalosporins     Clonidine     Metronidazole     Montelukast      "Morphine     Penicillins     Percocet [oxycodone-acetaminophen]     Simvastatin     Sulfa (sulfonamide antibiotics)     Tetracyclines     Trimethoprim        RELEVANT LABS/STUDIES:    Lab Results   Component Value Date    WBC 4.30 01/19/2021    HGB 14.3 01/19/2021    HCT 44.1 01/19/2021    MCV 91 01/19/2021     01/19/2021     BMP  Lab Results   Component Value Date     01/19/2021    K 4.8 01/19/2021     01/19/2021    CO2 25 01/19/2021    BUN 16 01/19/2021    CREATININE 0.95 01/19/2021    CALCIUM 9.9 01/19/2021    ESTGFRAFRICA >60 01/19/2021    EGFRNONAA 56 (A) 01/19/2021     Lab Results   Component Value Date    ALT 9 (L) 01/19/2021    AST 18 01/19/2021    ALKPHOS 83 01/19/2021    BILITOT 0.6 01/19/2021     Lab Results   Component Value Date    TSH 1.48 02/11/2021     No results found for: LABA1C, HGBA1C      VITALS  Vitals:    08/16/22 1023   BP: 131/62   Pulse: 71   Weight: 48.6 kg (107 lb 2.3 oz)       PHYSICAL EXAM  General: well developed, well nourished  Neurologic:   Gait: Normal   Psychomotor signs:  No involuntary movements or tremor  AIMS: none    PSYCHIATRIC EXAM:    Mental Status Exam:  Appearance: unremarkable, age appropriate  Behavior/Cooperation: limited/ appropriate friendly and cooperative, restless and fidgety ; wimpers throughout interview  Speech:  normal volume, slowed, dysarthia, articulation error, spontaneous  Language: uses words appropriately; expressive aphasia and some dysarthria  Mood: "lost"  Affect:  anxious  Thought Process: normal and logical, AUREA  Thought Content: normal, no suicidality, no homicidality, delusions, or paranoia, AUREA  Level of Consciousness: Alert and Oriented x3  Memory:  Severely limited.  Attention/concentration: Limited  Fund of Knowledge: AUREA  Insight:  Impaired  Judgment: Impaired      IMPRESSION:    Aranza Briceño is a 87 y.o. 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 Persistent alteration in mentation since 3/2022 after possible second ischemic stroke. Patient continues to demonstrate severe anxiety that manifests in whimpering, limitation in ADLs and hobbies, as well as sleeping difficulties.     Plan to discontinue Wellbutrin given risks of worsening anxiety and risk of seizures, as well as to discontinue Ativan given risk of falls and delirium. Continue Topamax 25 mg HS, Geodon 20 mg HS with the plan to eventually wean patient off both medications in the future given side effect profiles. Initiate Lexapro 10 mg daily for anxiety management. Change Trazodone dose to 100 mg HS to prevent daytime sedation.     DIAGNOSES:  Dementia likely 2/2 previous CVAs with behavioral disturbances    PLAN:  Psych Med:  - Discontinue Wellbutrin given risks of worsening anxiety and risk of seizures, as well as to discontinue Ativan given risk of falls and delirium.   - Continue Topamax 25 mg HS, Geodon 20 mg HS with the plan to eventually wean patient off both medications in the future given side effect profiles.   - Initiate Lexapro 10 mg daily for anxiety management.   -Change Trazodone dose to 100 mg HS to prevent daytime sedation.      Discussed with patient informed consent, risks vs. benefits, alternative treatments, side effect profile and the inherent unpredictability of individual responses to these treatments. Answered any questions patient may have had. The patient expresses understanding of the above and displays the capacity to agree with this current plan     Other: Labs: TSH, free T4, T3, folate, thiamine, B12, vitamin D, methylmalonic acid, homocysteine, CBC, CMP ordered today    RETURN TO CLINIC:  F/U in 3-4 weeks    Raiza Cardenas DO  8/16/2022 10:27 AM

## 2022-08-19 LAB — 1,25(OH)2D3 SERPL-MCNC: 31 PG/ML (ref 20–79)

## 2022-08-20 LAB — METHYLMALONATE SERPL-SCNC: 0.29 UMOL/L

## 2022-08-22 LAB — VIT B1 BLD-MCNC: 54 UG/L (ref 38–122)

## 2022-08-28 ENCOUNTER — PATIENT MESSAGE (OUTPATIENT)
Dept: PSYCHIATRY | Facility: CLINIC | Age: 87
End: 2022-08-28
Payer: MEDICARE

## 2022-09-07 ENCOUNTER — OFFICE VISIT (OUTPATIENT)
Dept: PSYCHIATRY | Facility: CLINIC | Age: 87
End: 2022-09-07
Payer: MEDICARE

## 2022-09-07 ENCOUNTER — TELEPHONE (OUTPATIENT)
Dept: PSYCHIATRY | Facility: CLINIC | Age: 87
End: 2022-09-07
Payer: MEDICARE

## 2022-09-07 VITALS
HEART RATE: 64 BPM | WEIGHT: 104.5 LBS | SYSTOLIC BLOOD PRESSURE: 131 MMHG | BODY MASS INDEX: 19.74 KG/M2 | DIASTOLIC BLOOD PRESSURE: 60 MMHG

## 2022-09-07 DIAGNOSIS — G47.09 OTHER INSOMNIA: ICD-10-CM

## 2022-09-07 DIAGNOSIS — F03.91 DEMENTIA WITH BEHAVIORAL DISTURBANCE, UNSPECIFIED DEMENTIA TYPE: Primary | ICD-10-CM

## 2022-09-07 DIAGNOSIS — F41.9 ANXIETY DISORDER, UNSPECIFIED TYPE: ICD-10-CM

## 2022-09-07 DIAGNOSIS — D32.0 BENIGN MENINGIOMA OF BRAIN: ICD-10-CM

## 2022-09-07 PROCEDURE — 99999 PR PBB SHADOW E&M-EST. PATIENT-LVL II: ICD-10-PCS | Mod: PBBFAC,,, | Performed by: PSYCHIATRY & NEUROLOGY

## 2022-09-07 PROCEDURE — 99215 OFFICE O/P EST HI 40 MIN: CPT | Mod: S$PBB,,, | Performed by: PSYCHIATRY & NEUROLOGY

## 2022-09-07 PROCEDURE — 99212 OFFICE O/P EST SF 10 MIN: CPT | Mod: PBBFAC | Performed by: PSYCHIATRY & NEUROLOGY

## 2022-09-07 PROCEDURE — 99215 PR OFFICE/OUTPT VISIT, EST, LEVL V, 40-54 MIN: ICD-10-PCS | Mod: S$PBB,,, | Performed by: PSYCHIATRY & NEUROLOGY

## 2022-09-07 PROCEDURE — 99999 PR PBB SHADOW E&M-EST. PATIENT-LVL II: CPT | Mod: PBBFAC,,, | Performed by: PSYCHIATRY & NEUROLOGY

## 2022-09-07 RX ORDER — LORAZEPAM 0.5 MG/1
TABLET ORAL
Qty: 30 TABLET | Refills: 0 | Status: SHIPPED | OUTPATIENT
Start: 2022-09-07

## 2022-09-07 NOTE — PROGRESS NOTES
OUTPATIENT PSYCHIATRY FOLLOW UP VISIT    ENCOUNTER DATE:  9/7/2022  SITE:  [facility], [facility]  LENGTH OF SESSION:  45mins minutes    DISCLAIMER: This note was prepared with [employer] voice recognition transcription software. Garbled syntax, mangled pronouns, and other bizarre constructions may be attributed to that software system       CHIEF COMPLAINT:  Anxiety, Memory Deficits, and Intrusive Thoughts      HISTORY OF PRESENTING ILLNESS:  Aranza Briceño is a 87 y.o. 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      Plan at last appointment on 08/16/2022 per Dr. Cardenas and Dr. Christensen  Psych Med:  - Discontinue Wellbutrin given risks of worsening anxiety and risk of seizures, as well as to discontinue Ativan given risk of falls and delirium.   - Continue Topamax 25 mg HS, Geodon 20 mg HS with the plan to eventually wean patient off both medications in the future given side effect profiles.   - Initiate Lexapro 10 mg daily for anxiety management.   -Change Trazodone dose to 100 mg HS to prevent daytime sedation.     Interval history as told by primarily patient's family including her son daughter and  with pts permission    Today on exam,   Patient appeared highly anxious and distressed with repetitive vocal sounds of being scared that.  Seemed to be started since she was in the waiting room all the way walking into my office.  Patient mostly nonverbal initially patient highly anxious on my exam needed daughter by site to calm patient down which took about 5-10 minutes.  When inquired about why patient is anxious and if she is fearful of something patient did voice in agreement but unable to elicit what she is afraid of or fearful of.  Used  "distraction techniques of discussing about patient's family how many children she had grandchildren she had upon this conversation patient seemed to have calmed down and jokingly reported we are [place]" and smiled patient then abruptly reverted back to being highly anxious and had repeated vocalizations.  Most of the interview was conducted with patient's family.  Patient's son reports that patient has been sleeping really well with the current medication regimen and appetite seemed to have improved.  However patient's family believes that her anxiety has worsened since medication changes of discontinuation of the Wellbutrin and starting the Lexapro however noted that patient's Ativan was also discontinued at this time discussed that perhaps discontinue a shin of the Ativan has contributed to worsening of patient's anxiety in addition to Lexapro not fully reaching therapeutic potential in 3 weeks and patient may need longer.  Patient's family understood this they did report that patient's confusion is a little worse at times and also her speech is a little worse.  At baseline prior to recent worsening of anxiety over the past couple of months patient was a lot more engaged use to do puzzles but now seems to not be interested in it or even pay attention to it.  Patient spends most of her days watching TV and family encourages patient to do different tasks including doing laundry things of that nature.  Patient does have increased agoraphobia where she does not like to go outside anymore and also is fearful sometimes of what she sees outside her windows.  Some days patient does not like to get out of bed or even get dressed and needs a lot of prompting to doing this.   also notes that recently patient reported once or twice that I want to die but has not reported any active plans nor is there a concern for safety.  Family does report that patient was started on CBD will 50 mg about a month ago and believes " this might have helped her appetite.  Family denied any other concerns at this time more so worried about patient's anxiety      PSYCHIATRIC REVIEW OF SYSTEMS:  Per patient's family    Symptoms of Depression:  Still about the same perhaps improved when he comes to neuro vegetative symptoms    Symptoms of Anxiety/ panic attacks:  Seems to have worsened with most recent change in medications    Symptoms of Thais/Hypomania:  Unable to fully assess but did not have any return of previous thais like symptoms    Symptoms of psychosis:  Denied any overt symptoms of psychosis    Sleep:  Improved with current med regimen    Appetite: improved with current med regimen    Alcohol:  Denied    Illicit Drugs:  Patient does use CBD oil for appetite stimulation    Psychosocial stressors:  No major stressors\  Risk Parameters:  Patient's family reports suicidal ideation:  Passive  Patient's familyreports no homicidal ideation  Patient's family reports no self-injurious behavior  Patient's family reports no violent behavior    PSYCHIATRIC MED REVIEW    Current psych meds  Geodon 20 mg q.h.s.  Topamax 25 mg q.h.s.   Trazodone 100 mg q.h.s.  Lexapro 10 mg q.day    Medication side effects:  No major reported side effects other than potential worsening of anxiety  Medication compliance:  Yes patient gets pill packs and is monitored by family    Previous psych meds trials  Wellbutrin 150 mg b.i.d. that was initiated by patient's neurologist family believes this has helped her anxiety and depression however was discontinued due to worsening of sleep and likely some psychosis.  Ativan was discontinued due to BEERS criteria and risk of falls and further cognitive impairment  Effexor ineffective   Memantine and Aricept had unknown side effects    PAST PSYCHIATRIC, MEDICAL, AND SOCIAL HISTORY REVIEWED  The patient's past medical, family and social history have been reviewed and updated as appropriate within the electronic medical record - see  encounter notes.    MEDICAL REVIEW OF SYSTEMS:  Complete review of systems performed covering Constitutional, Musculoskeletal, Neurologic.  All systems negative per family    ALL MEDICATIONS:    Current Outpatient Medications:     cholecalciferol, vitamin D3, (VITAMIN D3) 25 mcg (1,000 unit) capsule, Take 1,000 Units by mouth once daily., Disp: , Rfl:     clopidogreL (PLAVIX) 75 mg tablet, Take 75 mg by mouth every morning., Disp: , Rfl:     EScitalopram oxalate (LEXAPRO) 10 MG tablet, Take 1 tablet (10 mg total) by mouth once daily., Disp: 30 tablet, Rfl: 1    iron/FA/dha/epa/FAD/NADH/mv47 (ENLYTE ORAL), Take by mouth. 1 tab by mouth daily, Disp: , Rfl:     topiramate (TOPAMAX) 25 MG tablet, Take 1 tablet (25 mg total) by mouth every evening., Disp: 30 tablet, Rfl: 1    traZODone (DESYREL) 50 MG tablet, Take 2 tablets (100 mg total) by mouth every evening. Take 2 tablets (100 mg total) by mouth every evening., Disp: 60 tablet, Rfl: 1    UNABLE TO FIND, Barby TC one tablet by mouth nightly, Disp: , Rfl:     ziprasidone (GEODON) 20 MG Cap, Take 1 capsule (20 mg total) by mouth every evening., Disp: 30 capsule, Rfl: 1  No current facility-administered medications for this visit.    Facility-Administered Medications Ordered in Other Visits:     0.9%  NaCl infusion, , Intravenous, Continuous, Doroteo Huang MD, Stopped at 11/02/20 0944    ALLERGIES:  Review of patient's allergies indicates:   Allergen Reactions    Acetaminophen     Cephalosporins     Clonidine     Metronidazole     Montelukast     Morphine     Penicillins     Percocet [oxycodone-acetaminophen]     Simvastatin     Sulfa (sulfonamide antibiotics)     Tetracyclines     Trimethoprim        RELEVANT LABS/STUDIES:    Lab Results   Component Value Date    WBC 5.43 08/16/2022    HGB 13.9 08/16/2022    HCT 40.7 08/16/2022    MCV 93 08/16/2022     (L) 08/16/2022     BMP  Lab Results   Component Value Date     (L) 08/16/2022    K 4.2 08/16/2022      08/16/2022    CO2 27 08/16/2022    BUN 23 08/16/2022    CREATININE 0.8 08/16/2022    CALCIUM 9.8 08/16/2022    ANIONGAP 7 (L) 08/16/2022    ESTGFRAFRICA >60 01/19/2021    EGFRNONAA 56 (A) 01/19/2021     Lab Results   Component Value Date    ALT 12 08/16/2022    AST 10 08/16/2022    ALKPHOS 84 08/16/2022    BILITOT 0.4 08/16/2022     Lab Results   Component Value Date    TSH 1.286 08/16/2022     No results found for: LABA1C, HGBA1C    Narrative & Impression  EXAMINATION:  MRI BRAIN WITHOUT CONTRAST     CLINICAL HISTORY:  Disorientation, unspecifieddisorientation;     TECHNIQUE:  MRI of the brain was performed in multiple planes and sequences.     COMPARISON:  CT of the head from 03/11/2020.     FINDINGS:  Diffuse volume loss and white matter disease.  The ventricles and midline structures are normal.  There are 2 5 mm areas of increased diffusion signal in the left frontal parietal lobe the diffusion signal is moderate suggesting this is subacute to acute ischemia diffuse white matter disease.  Flow void in the carotid and basilar arteries consistent with patency along the right parietal skull posteriorly at the vertex is a calcified extra-axial lesion 7 mm likely a calcified meningioma or just bony proliferation off the skull..  Adjacent to the in just superior 2 posterior right orbit along the sphenoid wing probable 7 mm calcified meningioma     Impression:     1. Probable incidental small 7 mm right high parietal calcified meningioma and probable 7 mm calcified meningioma along the sphenoid wing superior to the right orbit  2. Diffuse white matter changes  3. On the diffusion images there are 2 5 mm areas of moderate increased diffusion signal in the left frontal parietal lobe probable small subacute to acute infarcts        Electronically signed by: Sophia Bruce MD  Date:                                            01/15/2021  Time:                                           15:24    VITALS  Vitals:     09/07/22 1124   BP: 131/60   Pulse: 64   Weight: 47.4 kg (104 lb 8 oz)       PHYSICAL EXAM  General: mild distress, pale, thin and gaunt  Neurologic:   Gait:  Wheelchair-bound  Psychomotor signs:  Some tremulousness in context of anxiety no resting tremor noted or intentional tremors  AIMS: none    PSYCHIATRIC EXAM:     Mental Status Exam:  Appearance: casually dressed, thin & gaunt looking  Behavior/Cooperation: reluctant to participate, restless and fidgety   Speech: dysarthia, minimal unable to fully assess  Language:  History of aphasia able to utter 1 or 2 words  Mood: anxious  Affect:  Mood congruent patient is highly anxious and distressed but with reactive affect  Thought Process: blocked, perseverative, limited unable to fully assess  Thought Content: delusions: no, hallucinations: (auditory: no, visual: no, illusions: no), obsessions: no, suicidal thoughts: (active-no, passive-yes), homicidal thoughts: (active-no, passive-no), ruminations, poverty of content  Level of Consciousness: Alert but not fully oriented however unable to test  Memory:  Impaired unable to fully test patient's family reports she is able to at times recall previous events however has poor recent memory   Attention/concentration:  Limited patient very distracted with anxiety  Insight:  Limited into patient's dementia and anxiety disorder  Judgment:  Limited but redirectable      IMPRESSION:    history of major neurocognitive disorder (dementia), unspecified anxiety, behavioral disturbances secondary to dementia, 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      Status/Progress:  Based on the examination today, the patient's problem(s) is/are adequately but not ideally controlled, worsening, and failing to respond as expected to treatment.  New problems have been presented today.     DIAGNOSES:    ICD-10-CM ICD-9-CM   1.  Dementia with behavioral disturbance, unspecified dementia type  F03.91 294.21   2. Anxiety disorder, unspecified type  F41.9 300.00   3. Benign meningioma of brain  D32.0 225.2   4. Other insomnia  G47.09 780.52       PLAN:  Psych Med:  Continue current med regimen   Continue Lexapro 10 mg q.day for anxiety and mood with plan to increase ease at next visit to 20 mg however some concern this might be worsening mood and anxiety will continue to monitor  Geodon 20 mg q.h.s. for behavioral issues and sleep and ruminations  Trazodone 100 mg q.h.s. for sleep   Topamax 25 mg for likely mood stabilization however plan to potentially discontinue this due to risk of polypharmacy.  Resume Ativan 0.5 mg as needed for severe anxiety or anxiety provoking situations such as leaving home and coming to a doctor's appointment.  Discussed risks of chronic benzodiazepine use especially in patients with dementia however in lieu of patient's severe anxiety and likely subtherapeutic Lexapro affects temporarily might be advisable to continue as needed Ativan.    Discussed with patient informed consent, risks vs. benefits, alternative treatments, side effect profile and the inherent unpredictability of individual responses to these treatments. Answered any questions patient may have had. The patient expresses understanding of the above and displays the capacity to agree with this current plan     Other:  Need to obtain EKG due to being on Geodon, a need to obtain sodium due to being on Lexapro last lab was 8/16 and was slightly low.  Reviewed MRi from 2021 defer to neurology regarding calcified meningioma    RETURN TO CLINIC:  3 weeks patient instructed to return on September 28th at 11:00 a.m.    DOMINIQUE JUAN MD   Department of Psychiatry   [facility]  Greater than 50% of the total time spent involved counseling and or coordination of care.   9/7/2022 11:28 AM

## 2022-09-07 NOTE — TELEPHONE ENCOUNTER
Spoke to Nory, pt was able to be seen by Dr Hills, does not need appt at this time to be seen sooner. Given my direct number to call for future appointments.           ----- Message from Yinka Christensen Jr., MD sent at 9/2/2022  1:11 PM CDT -----  Regarding: FW: Appt  Korin,  We wanted to see this Pt back 3 weeks after her previous visit.  See if there are any open slots or cancellations.    TLK  ----- Message -----  From: Antionette Johnson  Sent: 9/1/2022   4:57 PM CDT  To: Yinka Christensen Jr., MD  Subject: Appt                                             Ms. Cueto, Daughter is calling because she say the pt was to be scheduled to see you in the [facility] in three weeks (from 8/16/22).   does not see an open slot until January and she wants to speak with you regarding getting her in sooner.    She can be reached at [phone].    Thank you.

## 2022-09-26 NOTE — PROGRESS NOTES
Staff Note:     Pt interviewed and case discussed.  I agree with Resident's findings and treatment plan.  New Pt to clinic with hx of depression and both physical and functional decline since stroke 8 years ago.  Medication changes to target this and decrease fall risk were implemented.    TI

## 2022-09-28 ENCOUNTER — PATIENT MESSAGE (OUTPATIENT)
Dept: PSYCHIATRY | Facility: CLINIC | Age: 87
End: 2022-09-28

## 2022-09-28 ENCOUNTER — OFFICE VISIT (OUTPATIENT)
Dept: PSYCHIATRY | Facility: CLINIC | Age: 87
End: 2022-09-28
Payer: MEDICARE

## 2022-09-28 DIAGNOSIS — F03.91 DEMENTIA WITH BEHAVIORAL DISTURBANCE, UNSPECIFIED DEMENTIA TYPE: ICD-10-CM

## 2022-09-28 DIAGNOSIS — G47.09 OTHER INSOMNIA: ICD-10-CM

## 2022-09-28 DIAGNOSIS — F41.9 ANXIETY DISORDER, UNSPECIFIED TYPE: Primary | ICD-10-CM

## 2022-09-28 DIAGNOSIS — F32.A DEPRESSION, UNSPECIFIED DEPRESSION TYPE: ICD-10-CM

## 2022-09-28 PROCEDURE — 99214 OFFICE O/P EST MOD 30 MIN: CPT | Mod: 95,,, | Performed by: PSYCHIATRY & NEUROLOGY

## 2022-09-28 PROCEDURE — 99214 PR OFFICE/OUTPT VISIT, EST, LEVL IV, 30-39 MIN: ICD-10-PCS | Mod: 95,,, | Performed by: PSYCHIATRY & NEUROLOGY

## 2022-09-28 RX ORDER — ESCITALOPRAM OXALATE 10 MG/1
15 TABLET ORAL DAILY
Qty: 45 TABLET | Refills: 1 | Status: SHIPPED | OUTPATIENT
Start: 2022-09-28 | End: 2022-11-16

## 2022-09-28 RX ORDER — TRAZODONE HYDROCHLORIDE 50 MG/1
100 TABLET ORAL NIGHTLY
Qty: 60 TABLET | Refills: 1 | Status: SHIPPED | OUTPATIENT
Start: 2022-09-28 | End: 2022-11-16

## 2022-09-28 RX ORDER — TOPIRAMATE 25 MG/1
25 TABLET ORAL NIGHTLY
Qty: 30 TABLET | Refills: 1 | Status: SHIPPED | OUTPATIENT
Start: 2022-09-28 | End: 2023-05-02 | Stop reason: SDUPTHER

## 2022-09-28 RX ORDER — ZIPRASIDONE HYDROCHLORIDE 20 MG/1
20 CAPSULE ORAL NIGHTLY
Qty: 30 CAPSULE | Refills: 1 | Status: SHIPPED | OUTPATIENT
Start: 2022-09-28 | End: 2023-01-09

## 2022-09-28 NOTE — PROGRESS NOTES
OUTPATIENT PSYCHIATRY FOLLOW UP VISIT    ENCOUNTER DATE:  9/28/2022  SITE:  [facility]  LENGTH OF SESSION:  40 minutes    TELE PSYCHIATRY Disclaimer   *The patient was informed despite using HIPPA compliant technology there may be risks including security breach, technological failure, inability to perform a comprehensive physical exam which could delay or prevent an accurate diagnosis, and potential complications from treatment decisions rendered over a telemedical platform. The patient understands and consented to the use of tele-health service as being a safe measure to mitigate during COVID 19 Pandemic .  The patient was also informed of the relationship between the physician and patient and the respective role of any other health care provider with respect to management of the patient; and notified that the pt may decline to receive medical services by telemedicine and may withdraw from such care at any time.     Patient's Current location: [address] [place] [address]  In Case of Emergency pts next of kin  Name: Nory Conley, daughter  Phone number:  [phone]   Visit type: Virtual visit with synchronous audio and video  Total time spent with patient: 40mins      CHIEF COMPLAINT:  Anxiety and Memory Deficits      HISTORY OF PRESENTING ILLNESS:  Aranza Briceño is a 87 y.o. 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      Plan at last appointment on 9/7/2022:  Psych Med:  Continue current med regimen   Continue Lexapro 10 mg q.day for anxiety and mood with plan to increase ease at next visit to 20 mg however some concern this might be worsening mood and anxiety will continue to monitor  Geodon 20 mg q.h.s. for behavioral issues and  sleep and ruminations  Trazodone 100 mg q.h.s. for sleep   Topamax 25 mg for likely mood stabilization however plan to potentially discontinue this due to risk of polypharmacy.  Resume Ativan 0.5 mg as needed for severe anxiety or anxiety provoking situations such as leaving home and coming to a doctor's appointment.      Interval history as told by Patient - & or family/caregiver ( Daughter Nory Hall) with pts permission  Today on exam, patient reports that she is doing okay due to patient's aphasia still difficult to understand but patient objectively less anxious, cooperative for the most part during the interview.  Per family most of the history was obtained who believes that patient overall has improved in terms of anxiety.  For example they have noticed that patient is no longer humming and that it has decreased in its intensity.  In regards to patient's repetitive ruminative behaviors they have also noticed improvement in this.  For example patient is spending more and more time in the living room and is not as preoccupied with going into the bedroom and isolating.  Patient also is minimally now engaging in ADLs a folding clothes etc..  They also state that patient has gained about 5 lb as she is eating better and noted that she is also eating more sweets.  They report that patient's sleep has improved however still has some urinary incontinence that is disruptive for her sleep.  They still note some anxiety and some fearfulness however this has reduced a lot compared to bleed to last visit.  They still report that patient is not engaging in other things and is just watching TV.    They do report that patient has been using CBD oil and hemp oil 100 mg each day that seems to be helping patient they report getting that from a friend.  Reported that I am not able to comment on continuation or discontinuation of this supplement due to experience and not currently FDA approved for anxiety.    PSYCHIATRIC  REVIEW OF SYSTEMS:(none/ yes- better/worse/stable/& what symptoms)    Symptoms of Depression:  Improving per patient's family    Symptoms of Anxiety/ panic attacks:  Improving per patient's family    Symptoms of Thais/Hypomania:  None noted    Symptoms of psychosis:  None noted    Sleep:  Improved    Appetite:  Improved    Alcohol:  Denied    Illicit Drugs:  Denied other than CBD oil    Psychosocial stressors:  Health concerns     Risk Parameters:  Patient's family reports suicidal ideation:  Passive  Patient's familyreports no homicidal ideation  Patient's family reports no self-injurious behavior  Patient's family reports no violent behavior    PSYCHIATRIC MED REVIEW    Current psych meds  Geodon 20 mg q.h.s.  Topamax 25 mg q.h.s.   Trazodone 100 mg q.h.s.  Lexapro 10 mg q.day     Medication side effects:  No major reported side effects   Medication compliance:  Yes patient gets pill packs and is monitored by family     Previous psych meds trials  Wellbutrin 150 mg b.i.d. that was initiated by patient's neurologist family believes this has helped her anxiety and depression however was discontinued due to worsening of sleep and likely some psychosis.  Ativan was discontinued due to BEERS criteria and risk of falls and further cognitive impairment  Effexor ineffective   Memantine and Aricept had unknown side effects    PAST PSYCHIATRIC, MEDICAL, AND SOCIAL HISTORY REVIEWED  The patient's past medical, family and social history have been reviewed and updated as appropriate within the electronic medical record - see encounter notes.    MEDICAL REVIEW OF SYSTEMS:  Complete review of systems performed covering Constitutional, Musculoskeletal, Neurologic.  All systems negative    ALL MEDICATIONS:    Current Outpatient Medications:     cholecalciferol, vitamin D3, (VITAMIN D3) 25 mcg (1,000 unit) capsule, Take 1,000 Units by mouth once daily., Disp: , Rfl:     clopidogreL (PLAVIX) 75 mg tablet, Take 75 mg by mouth every  morning., Disp: , Rfl:     EScitalopram oxalate (LEXAPRO) 10 MG tablet, Take 1.5 tablets (15 mg total) by mouth once daily., Disp: 45 tablet, Rfl: 1    iron/FA/dha/epa/FAD/NADH/mv47 (ENLYTE ORAL), Take by mouth. 1 tab by mouth daily, Disp: , Rfl:     LORazepam (ATIVAN) 0.5 MG tablet, ONLY AS NEEDED FOR SEVERE ANXIETY OR BEFORE ANY SITUATIONS THAT WILL CAUSE SEVERE ANXIETY, Disp: 30 tablet, Rfl: 0    topiramate (TOPAMAX) 25 MG tablet, Take 1 tablet (25 mg total) by mouth every evening., Disp: 30 tablet, Rfl: 1    traZODone (DESYREL) 50 MG tablet, Take 2 tablets (100 mg total) by mouth every evening. Take 2 tablets (100 mg total) by mouth every evening., Disp: 60 tablet, Rfl: 1    UNABLE TO FIND, Barby TC one tablet by mouth nightly, Disp: , Rfl:     ziprasidone (GEODON) 20 MG Cap, Take 1 capsule (20 mg total) by mouth every evening., Disp: 30 capsule, Rfl: 1  No current facility-administered medications for this visit.    Facility-Administered Medications Ordered in Other Visits:     0.9%  NaCl infusion, , Intravenous, Continuous, Doroteo Huang MD, Stopped at 11/02/20 0944    ALLERGIES:  Review of patient's allergies indicates:   Allergen Reactions    Acetaminophen     Cephalosporins     Clonidine     Metronidazole     Montelukast     Morphine     Penicillins     Percocet [oxycodone-acetaminophen]     Simvastatin     Sulfa (sulfonamide antibiotics)     Tetracyclines     Trimethoprim        RELEVANT LABS/STUDIES:    Lab Results   Component Value Date    WBC 5.43 08/16/2022    HGB 13.9 08/16/2022    HCT 40.7 08/16/2022    MCV 93 08/16/2022     (L) 08/16/2022     BMP  Lab Results   Component Value Date     (L) 08/16/2022    K 4.2 08/16/2022     08/16/2022    CO2 27 08/16/2022    BUN 23 08/16/2022    CREATININE 0.8 08/16/2022    CALCIUM 9.8 08/16/2022    ANIONGAP 7 (L) 08/16/2022    ESTGFRAFRICA >60 01/19/2021    EGFRNONAA 56 (A) 01/19/2021     Lab Results   Component Value Date    ALT 12  08/16/2022    AST 10 08/16/2022    ALKPHOS 84 08/16/2022    BILITOT 0.4 08/16/2022     Lab Results   Component Value Date    TSH 1.286 08/16/2022     No results found for: LABA1C, HGBA1C    VITALS per family patient's vitals have been stable encouraged to document this and report at next visit.  There were no vitals filed for this visit.    PHYSICAL EXAM  General: appears stated age  Neurologic:   Gait:  In wheelchair but able to ambulate more using walker now  Psychomotor signs:  No involuntary movements or tremor noted on virtual visit exam  AIMS:  unable to assess at this time, will continue to monitor patient has not had any recent falls.    PSYCHIATRIC EXAM:     Mental Status Exam:  Appearance: casually dressed, thin & gaunt looking  Behavior/Cooperation:  More calm and cooperative with the interview less restless   Speech: dysarthia, minimal unable to fully assess  Language:  History of aphasia able to utter 1 or 2 words  Mood:  Euthymic  Affect:  Improve no longer anxious or had distressed appearance.  Thought Process:  Somewhat blocked, and less perseverative, limited unable to fully assess  Thought Content: delusions: no, hallucinations: (auditory: no, visual: no, illusions: no), obsessions: no, suicidal thoughts: (active-no, passive-note, homicidal thoughts: (active-no, passive-no), ruminations, poverty of content  Level of Consciousness: Alert but not fully oriented however unable to test  Memory:  Impaired unable to fully test patient's family reports she is able to at times recall previous events however has poor recent memory   Attention/concentration:  Limited patient seems to be less distracted  Insight:  Limited into patient's dementia  Judgment:  Limited but redirectable        IMPRESSION:    Patient is a 87-year-old female with history of major neurocognitive disorder (dementia), unspecified anxiety, behavioral disturbances secondary to dementia, past neurological history of left CVA in 2016 that  resulted in expressive aphasia who presents for follow up appointment with her family for continued anxiety.      Status/Progress:  Based on the examination today, the patient's problem(s) is/are improved.  New problems have not been presented today.     DIAGNOSES:    ICD-10-CM ICD-9-CM   1. Dementia with behavioral disturbance, unspecified dementia type  F03.91 294.21   2. Other insomnia  G47.09 780.52   3. Anxiety disorder, unspecified type  F41.9 300.00   4. Depression, unspecified depression type  F32.A 311       PLAN:  Psych Med:  INCREASE Lexapro 15 mg q.day for residual symptoms of anxiety and mood with plan to increase at next visit to 20 mg if needed   CONTINUE current med regimen   Geodon 20 mg q.h.s. for behavioral issues and sleep and ruminations  Trazodone 100 mg q.h.s. for sleep   Topamax 25 mg for likely mood stabilization however plan to potentially discontinue this due to risk of polypharmacy.   Ativan 0.5 mg as needed for severe anxiety or anxiety provoking situations such as leaving home and coming to a doctor's appointment. Informed pt of the risks of continuous Benzodiazepine use including tolerance, dependence and withdrawals that may be life threatening upon abrupt cessation. Also advised not to take Benzodiazepines with Opiates or other sedatives and also not to drive or operate heavy machinery while using Benzodiazepines.   OTHER   Discussed with family regarding certain behavioral exercises that they can do with patient that have been shown to improve cognition depression.  Discussed about increasing patient's physical activity with at least 5-10 minutes walk each day engaging in some mentally stimulating activities such as doing puzzles which patient used to enjoy doing before.  Identified that patient's dexterity is no longer the same provided option of utilizing larger possible pieces for example the ones that are available for younger children.  Also discussed utility of music therapy  which also enhances memory and relaxes patients with dementia.  Lastly discussed having a routine and schedule for patient that reduces anxiety response when taken out of familiar place and people.    Discussed with patient and family informed consent, risks vs. benefits, alternative treatments, side effect profile and the inherent unpredictability of individual responses to these treatments. Answered any questions they may have had.  They express understanding of the above and displays the capacity to agree with this current plan       RETURN TO CLINIC:  Follow up in about 4 weeks (around 10/26/2022).    DOMINIQUE JUAN MD   Department of Psychiatry   [facility]  9/28/2022 11:38 AM     DISCLAIMER: This note was prepared with [employer] voice recognition transcription software. Garbled syntax, mangled pronouns, and other bizarre constructions may be attributed to that software system

## 2022-10-26 ENCOUNTER — OFFICE VISIT (OUTPATIENT)
Dept: PSYCHIATRY | Facility: CLINIC | Age: 87
End: 2022-10-26
Payer: MEDICARE

## 2022-10-26 DIAGNOSIS — F03.918 DEMENTIA WITH BEHAVIORAL DISTURBANCE: Primary | ICD-10-CM

## 2022-10-26 DIAGNOSIS — G47.09 OTHER INSOMNIA: ICD-10-CM

## 2022-10-26 DIAGNOSIS — F41.9 ANXIETY DISORDER, UNSPECIFIED TYPE: ICD-10-CM

## 2022-10-26 DIAGNOSIS — F32.A DEPRESSION, UNSPECIFIED DEPRESSION TYPE: ICD-10-CM

## 2022-10-26 PROCEDURE — 99213 PR OFFICE/OUTPT VISIT, EST, LEVL III, 20-29 MIN: ICD-10-PCS | Mod: 95,,, | Performed by: PSYCHIATRY & NEUROLOGY

## 2022-10-26 PROCEDURE — 99213 OFFICE O/P EST LOW 20 MIN: CPT | Mod: 95,,, | Performed by: PSYCHIATRY & NEUROLOGY

## 2022-10-26 PROCEDURE — 99999 PR PBB SHADOW E&M-EST. PATIENT-LVL I: ICD-10-PCS | Mod: PBBFAC,,, | Performed by: PSYCHIATRY & NEUROLOGY

## 2022-10-26 PROCEDURE — 99211 OFF/OP EST MAY X REQ PHY/QHP: CPT | Mod: PBBFAC | Performed by: PSYCHIATRY & NEUROLOGY

## 2022-10-26 PROCEDURE — 99999 PR PBB SHADOW E&M-EST. PATIENT-LVL I: CPT | Mod: PBBFAC,,, | Performed by: PSYCHIATRY & NEUROLOGY

## 2022-10-26 NOTE — PROGRESS NOTES
OUTPATIENT PSYCHIATRY FOLLOW UP VISIT    ENCOUNTER DATE:  10/26/2022  SITE:  [facility], [facility]  LENGTH OF SESSION:  26 minutes    TELE PSYCHIATRY Disclaimer   *The patient was informed despite using HIPPA compliant technology there may be risks including security breach, technological failure, inability to perform a comprehensive physical exam which could delay or prevent an accurate diagnosis, and potential complications from treatment decisions rendered over a telemedical platform. The patient understands and consented to the use of tele-health service as being a safe measure to mitigate during COVID 19 Pandemic .  The patient was also informed of the relationship between the physician and patient and the respective role of any other health care provider with respect to management of the patient; and notified that the pt may decline to receive medical services by telemedicine and may withdraw from such care at any time.      Patient's Current location: [address] [place] [address]  In Case of Emergency pts next of kin  Name: Nory Conley, daughter  Phone number:  [phone]             Visit type: Virtual visit with synchronous audio and video  Total time spent with patient: 40mins       CHIEF COMPLAINT:  Anxiety and Memory Deficits        HISTORY OF PRESENTING ILLNESS:  Aranza Briceño is a 87 y.o. 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      Plan at last appointment on 9/28/2022:  INCREASE Lexapro 15 mg q.day for residual symptoms of anxiety and mood with plan to increase at next visit to 20 mg if needed   CONTINUE current med regimen   ? Geodon 20 mg q.h.s. for behavioral issues and sleep and ruminations  ? Trazodone 100 mg q.h.s. for sleep    ? Topamax 25 mg for likely mood stabilization however plan to potentially discontinue this due to risk of polypharmacy.  ?  Ativan 0.5 mg as needed for severe anxiety or anxiety provoking situations such as leaving home and coming to a doctor's appointment.    Interval history as told by Patient - but mostly by family, 2 daughters & spouse/caregiver with pts permission    Today on exam patient seems to be very calm during the interview however still unable to volunteer much information seems to get flustered when tried to answer questions.  Per patient's daughter's patient is able to better concentrate especially when doing puzzles at they also note that patient overall is humming less compared to how she was couple months ago.  Patient also is not distress but still has a fear of new situations and changes to her routine.  They report getting Ativan maybe once or twice this past month situations where she took the house to the doctors appointment.  Family is happy to report patient had actually been engaging in more all her ADLs and is making her bed and cleaning dishes add seems more physically active.  They are also impressed with patient remembered how her son did not bring his dog when came to visit her.  They also states that patient has been sleeping better and eating more which is a happy to see.  Overall patient's anxiety has improved significantly still has episodes of confusion and related worsening in mood however this is less intense frequent and more easily redirectable.      PSYCHIATRIC REVIEW OF SYSTEMS:(none/ yes- better/worse/stable/& what symptoms)    Symptoms of Depression:  None reported     Symptoms of Anxiety/ panic attacks:  Improved     Symptoms of Thais/Hypomania:  Denies    Symptoms of psychosis:  Denied    Sleep:  Improved    Appetite:  Improving    Alcohol:  None    Illicit Drugs:  None    Risk Parameters:  Patient reports no suicidal ideation  Patient reports no homicidal  ideation  Patient reports no self-injurious behavior  Patient reports no violent behavior    PSYCHIATRIC MED REVIEW    Current psych meds  Geodon 20 mg q.h.s.  Topamax 25 mg q.h.s.   Trazodone 100 mg q.h.s.  Lexapro 15 mg q.day   Ativan 0.5mg PRN  Medication side effects:  No major reported side effects   Medication compliance:  Yes patient gets pill packs and is monitored by family     Previous psych meds trials  Wellbutrin 150 mg b.i.d. that was initiated by patient's neurologist family believes this has helped her anxiety and depression however was discontinued due to worsening of sleep and likely some psychosis.  Ativan was discontinued due to BEERS criteria and risk of falls and further cognitive impairment however resumed only as a PRN for severe anxiety   Effexor ineffective   Memantine and Aricept had unknown side effects    PAST PSYCHIATRIC, MEDICAL, AND SOCIAL HISTORY REVIEWED  The patient's past medical, family and social history have been reviewed and updated as appropriate within the electronic medical record - see encounter notes.    MEDICAL REVIEW OF SYSTEMS:  Complete review of systems performed covering Constitutional, Musculoskeletal, Neurologic.  All systems negative No falls or gait ataxia or any dizziness    ALL MEDICATIONS:    Current Outpatient Medications:     cholecalciferol, vitamin D3, (VITAMIN D3) 25 mcg (1,000 unit) capsule, Take 1,000 Units by mouth once daily., Disp: , Rfl:     clopidogreL (PLAVIX) 75 mg tablet, Take 75 mg by mouth every morning., Disp: , Rfl:     EScitalopram oxalate (LEXAPRO) 10 MG tablet, Take 1.5 tablets (15 mg total) by mouth once daily., Disp: 45 tablet, Rfl: 1    iron/FA/dha/epa/FAD/NADH/mv47 (ENLYTE ORAL), Take by mouth. 1 tab by mouth daily, Disp: , Rfl:     LORazepam (ATIVAN) 0.5 MG tablet, ONLY AS NEEDED FOR SEVERE ANXIETY OR BEFORE ANY SITUATIONS THAT WILL CAUSE SEVERE ANXIETY, Disp: 30 tablet, Rfl: 0    topiramate (TOPAMAX) 25 MG tablet, Take 1 tablet (25  mg total) by mouth every evening., Disp: 30 tablet, Rfl: 1    traZODone (DESYREL) 50 MG tablet, Take 2 tablets (100 mg total) by mouth every evening. Take 2 tablets (100 mg total) by mouth every evening., Disp: 60 tablet, Rfl: 1    UNABLE TO FIND, Barby TC one tablet by mouth nightly, Disp: , Rfl:     ziprasidone (GEODON) 20 MG Cap, Take 1 capsule (20 mg total) by mouth every evening., Disp: 30 capsule, Rfl: 1  No current facility-administered medications for this visit.    Facility-Administered Medications Ordered in Other Visits:     0.9%  NaCl infusion, , Intravenous, Continuous, Doroteo Huang MD, Stopped at 11/02/20 0944    ALLERGIES:  Review of patient's allergies indicates:   Allergen Reactions    Acetaminophen     Cephalosporins     Clonidine     Metronidazole     Montelukast     Morphine     Penicillins     Percocet [oxycodone-acetaminophen]     Simvastatin     Sulfa (sulfonamide antibiotics)     Tetracyclines     Trimethoprim        RELEVANT LABS/STUDIES:    Lab Results   Component Value Date    WBC 5.43 08/16/2022    HGB 13.9 08/16/2022    HCT 40.7 08/16/2022    MCV 93 08/16/2022     (L) 08/16/2022     BMP  Lab Results   Component Value Date     10/20/2022    K 4.0 10/20/2022     10/20/2022    CO2 28 10/20/2022    BUN 23 (H) 10/20/2022    CREATININE 0.80 10/20/2022    CALCIUM 9.3 10/20/2022    ANIONGAP 3 (L) 10/20/2022    ESTGFRAFRICA >60 01/19/2021    EGFRNONAA 56 (A) 01/19/2021     Lab Results   Component Value Date    ALT 81 (H) 10/20/2022    AST 67 (H) 10/20/2022    ALKPHOS 131 10/20/2022    BILITOT 0.6 10/20/2022     Lab Results   Component Value Date    TSH 1.286 08/16/2022     No results found for: LABA1C, HGBA1C    VITALS  There were no vitals filed for this visit.    PHYSICAL EXAM  General: appears stated age  Neurologic:   Gait:  In wheelchair but able to ambulate more using walker now  Psychomotor signs:  No involuntary movements or tremor noted on virtual visit  exam  AIMS:  unable to assess at this time, will continue to monitor patient has not had any recent falls.     PSYCHIATRIC EXAM:     Mental Status Exam:  Appearance: casually dressed, thin & gaunt looking  Behavior/Cooperation:  More calm and cooperative with the interview less restless   Speech: dysarthia, minimal unable to fully assess  Language:  History of aphasia able to utter 1 or 2 words  Mood:  Euthymic  Affect:  Improved no longer anxious or had distressed appearance.  Thought Process:  Somewhat blocked, and less perseverative, limited unable to fully assess  Thought Content: delusions: no, hallucinations: (auditory: no, visual: no, illusions: no), obsessions: no, suicidal thoughts: (active-no, passive-note, homicidal thoughts: (active-no, passive-no), ruminations, poverty of content  Level of Consciousness: Alert but not fully oriented however unable to test  Memory:  Impaired unable to fully test patient's family reports she is able to at times recall previous events however has poor recent memory   Attention/concentration:  Limited patient seems to be less distracted  Insight:  Limited into patient's dementia  Judgment:  Limited but redirectable      IMPRESSION:    Patient is a 87-year-old female with history of major neurocognitive disorder (dementia), unspecified anxiety, behavioral disturbances secondary to dementia, 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       Status/Progress:  Based on the examination today, the patient's problem(s) is/are improved.  New problems have not been presented today.     DIAGNOSES:    ICD-10-CM ICD-9-CM   1. Dementia with behavioral disturbance  F03.918 294.21   2. Anxiety disorder, unspecified type  F41.9 300.00   3. Other insomnia  G47.09 780.52   4. Depression, unspecified depression type  F32.A 311       PLAN:  Psych Med:  CONTINUE current med  regimen   Lexapro 15mg qday for anxiety  Geodon 20 mg q.h.s. for behavioral issues and sleep and ruminations  Trazodone 100 mg q.h.s. for sleep   Topamax 25 mg for likely mood stabilization however plan to potentially discontinue this due to risk of polypharmacy.   Ativan 0.5 mg as needed for severe anxiety or anxiety provoking situations such as leaving home and coming to a doctor's appointment. Informed pt of the risks of continuous Benzodiazepine use including tolerance, dependence and withdrawals that may be life threatening upon abrupt cessation. Also advised not to take Benzodiazepines with Opiates or other sedatives and also not to drive or operate heavy machinery while using Benzodiazepines.    Discussed with patient and family informed consent, risks vs. benefits, alternative treatments, side effect profile and the inherent unpredictability of individual responses to these treatments. Answered any questions they may have had.  They express understanding of the above and displays the capacity to agree with this current plan     Other: may need vitals & AIMS at next visit    RETURN TO CLINIC: 2-3 month      DOMINIQUE JUAN MD   Department of Psychiatry   [facility]  10/26/2022 11:08 AM

## 2022-11-15 ENCOUNTER — CLINICAL SUPPORT (OUTPATIENT)
Dept: PSYCHIATRY | Facility: CLINIC | Age: 87
End: 2022-11-15
Payer: MEDICARE

## 2022-11-15 PROCEDURE — 99211 OFF/OP EST MAY X REQ PHY/QHP: CPT | Mod: PBBFAC

## 2022-11-15 PROCEDURE — 99999 PR PBB SHADOW E&M-EST. PATIENT-LVL I: CPT | Mod: PBBFAC,,,

## 2022-11-15 PROCEDURE — 99999 PR PBB SHADOW E&M-EST. PATIENT-LVL I: ICD-10-PCS | Mod: PBBFAC,,,

## 2022-12-04 ENCOUNTER — PATIENT MESSAGE (OUTPATIENT)
Dept: PSYCHIATRY | Facility: CLINIC | Age: 87
End: 2022-12-04
Payer: MEDICARE

## 2023-01-25 ENCOUNTER — DOCUMENT SCAN (OUTPATIENT)
Dept: HOME HEALTH SERVICES | Facility: HOSPITAL | Age: 88
End: 2023-01-25
Payer: MEDICARE

## 2023-02-16 ENCOUNTER — CLINICAL SUPPORT (OUTPATIENT)
Dept: PSYCHIATRY | Facility: CLINIC | Age: 88
End: 2023-02-16
Payer: MEDICARE

## 2023-02-16 VITALS
WEIGHT: 119 LBS | BODY MASS INDEX: 22.48 KG/M2 | HEART RATE: 65 BPM | SYSTOLIC BLOOD PRESSURE: 120 MMHG | DIASTOLIC BLOOD PRESSURE: 58 MMHG

## 2023-02-16 DIAGNOSIS — F41.9 ANXIETY DISORDER, UNSPECIFIED TYPE: Primary | ICD-10-CM

## 2023-02-16 DIAGNOSIS — F03.918 DEMENTIA WITH BEHAVIORAL DISTURBANCE: Primary | ICD-10-CM

## 2023-02-16 DIAGNOSIS — Z79.899 LONG-TERM USE OF HIGH-RISK MEDICATION: ICD-10-CM

## 2023-02-16 PROBLEM — G30.1 ALZHEIMER'S DISEASE WITH LATE ONSET (CODE): Status: ACTIVE | Noted: 2021-03-15

## 2023-02-16 PROCEDURE — 99999 PR PBB SHADOW E&M-EST. PATIENT-LVL I: ICD-10-PCS | Mod: PBBFAC,,,

## 2023-02-16 PROCEDURE — 99211 OFF/OP EST MAY X REQ PHY/QHP: CPT | Mod: PBBFAC

## 2023-02-16 PROCEDURE — 99215 OFFICE O/P EST HI 40 MIN: CPT | Mod: S$PBB,,, | Performed by: PSYCHIATRY & NEUROLOGY

## 2023-02-16 PROCEDURE — 99215 PR OFFICE/OUTPT VISIT, EST, LEVL V, 40-54 MIN: ICD-10-PCS | Mod: S$PBB,,, | Performed by: PSYCHIATRY & NEUROLOGY

## 2023-02-16 PROCEDURE — 99999 PR PBB SHADOW E&M-EST. PATIENT-LVL I: CPT | Mod: PBBFAC,,,

## 2023-02-16 NOTE — PROGRESS NOTES
OUTPATIENT PSYCHIATRY FOLLOW UP VISIT    ENCOUNTER DATE:  2/16/2023  SITE:  [facility], [facility]  LENGTH OF SESSION:  45 minutes       CHIEF COMPLAINT:  Anxiety and Memory Deficits        HISTORY OF PRESENTING ILLNESS:  Aranza Briceño is a 87 y.o. 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      Interval history as told by Patient - patient, but mostly her two children and     Family reports that patient is doing well overall since last appt. Today on interview patient is calm and cooperative though ability to participate in interview is limited. She appears to become anxious when attempting to volunteer information and having difficulty with expression and word finding. Family states her ability to verbally communicate has slowly decreased over time. Family reports that her Lexapro was increased by Neurologist in December in line with psychiatry's plan and that patient has tolerated well. They feel that her anxiety appears to have decreased since that time. She is also no longer taking PRN ativan for anxiety. Patient participates well in puzzles and brain games throughout the day and seems to enjoy watching sports and Westerns and visiting with family. She sleeps well, around 10-12 hours per night, which family has concerns that this may be too much sleep. Pt has an improved appetite lately and has regained about 10 pounds. They deny episodes of irritability, combativeness, and hallucinations. Memory is stable, still having issues with short-term memory but patient is able to recognize family members and knows what time she eats, takes medication, and gets in bed. Family denies any concerning mood symptoms or depression/SI. Feel that the patient  is happy and enjoying her daily activities. Family says that pt has been pointing to and complaining of abdominal pain for the last month. She had a CT abdomen which was unremarkable, pt was started on Protonix which they feel has been effective. Around the same time the abdominal pain developed, they also noted foul smelling urine and increased episodes of urinary incontinence overnight while sleeping. Pt has been receiving AZO supplements with improvement in urinary symptoms though never had her urine check for an infection. Patient is able to engage in some ADLs without assistance, such as showering, dressing herself, making her bed, etc. She uses a walker when ambulating around the house. They deny any recent falls or injuries. She is taking vitamin supplements as directed by neurology.       PSYCHIATRIC REVIEW OF SYSTEMS:(none/ yes- better/worse/stable/& what symptoms)    Symptoms of Depression:  None reported     Symptoms of Anxiety/ panic attacks: Improved since increasing lexapro but continue to feels anxious when having difficulty expressing self    Symptoms of Thais/Hypomania:  Denies    Symptoms of psychosis:  Denied    Sleep:  Improved-sleeping 10 to 12 hours     Appetite:  Improved    Alcohol:  None    Illicit Drugs:  None    Risk Parameters:  Patient reports no suicidal ideation  Patient reports no homicidal ideation  Patient reports no self-injurious behavior  Patient reports no violent behavior    PSYCHIATRIC MED REVIEW    Current psych meds  Geodon 20 mg q.h.s.  Topamax 25 mg q.h.s.   Trazodone 100 mg q.h.s.  Lexapro 15 mg q.day   Ativan 0.5mg PRN  Medication side effects:  No major reported side effects   Medication compliance:  Yes patient gets pill packs and is monitored by family     Previous psych meds trials  Wellbutrin 150 mg b.i.d. that was initiated by patient's neurologist family believes this has helped her anxiety and depression however was discontinued due to worsening of sleep and  likely some psychosis.  Ativan was discontinued due to BEERS criteria and risk of falls and further cognitive impairment however resumed only as a PRN for severe anxiety   Effexor ineffective   Memantine and Aricept had unknown side effects    PAST PSYCHIATRIC, MEDICAL, AND SOCIAL HISTORY REVIEWED  The patient's past medical, family and social history have been reviewed and updated as appropriate within the electronic medical record - see encounter notes.    MEDICAL REVIEW OF SYSTEMS:  Complete review of systems performed covering Constitutional, Musculoskeletal, Neurologic.  All systems negative No falls or gait ataxia or any dizziness    ALL MEDICATIONS:    Current Outpatient Medications:     cholecalciferol, vitamin D3, (VITAMIN D3) 25 mcg (1,000 unit) capsule, Take 1,000 Units by mouth once daily., Disp: , Rfl:     clopidogreL (PLAVIX) 75 mg tablet, Take 75 mg by mouth every morning., Disp: , Rfl:     EScitalopram oxalate (LEXAPRO) 20 MG tablet, Take 1 tablet (20 mg total) by mouth once daily., Disp: 30 tablet, Rfl: 11    iron/FA/dha/epa/FAD/NADH/mv47 (ENLYTE ORAL), Take by mouth. 1 tab by mouth daily, Disp: , Rfl:     LORazepam (ATIVAN) 0.5 MG tablet, ONLY AS NEEDED FOR SEVERE ANXIETY OR BEFORE ANY SITUATIONS THAT WILL CAUSE SEVERE ANXIETY, Disp: 30 tablet, Rfl: 0    topiramate (TOPAMAX) 25 MG tablet, Take 1 tablet (25 mg total) by mouth every evening., Disp: 30 tablet, Rfl: 1    traZODone (DESYREL) 50 MG tablet, TAKE TWO TABLETS BY MOUTH EVERY NIGHT, Disp: 60 tablet, Rfl: 1    UNABLE TO FIND, Barby TC one tablet by mouth nightly, Disp: , Rfl:     ziprasidone (GEODON) 20 MG Cap, TAKE ONE CAPSULE BY MOUTH EVERY NIGHT, Disp: 30 capsule, Rfl: 1  No current facility-administered medications for this visit.    Facility-Administered Medications Ordered in Other Visits:     0.9%  NaCl infusion, , Intravenous, Continuous, Doroteo Huang MD, Stopped at 11/02/20 0441    ALLERGIES:  Review of patient's allergies  indicates:   Allergen Reactions    Cephalosporins     Clonidine     Metronidazole     Morphine     Penicillins     Percocet [oxycodone-acetaminophen]     Simvastatin     Sulfa (sulfonamide antibiotics)     Tetracyclines     Trimethoprim        RELEVANT LABS/STUDIES:    Lab Results   Component Value Date    WBC 6.00 01/23/2023    HGB 14.0 01/23/2023    HCT 41.0 01/23/2023    MCV 92 01/23/2023     (L) 01/23/2023     BMP  Lab Results   Component Value Date     (L) 01/23/2023    K 4.9 01/23/2023     01/23/2023    CO2 29 01/23/2023    BUN 33 (H) 01/23/2023    CREATININE 1.02 01/23/2023    CALCIUM 9.3 01/23/2023    ANIONGAP 4 (L) 01/23/2023    ESTGFRAFRICA >60 01/19/2021    EGFRNONAA 56 (A) 01/19/2021     Lab Results   Component Value Date    ALT 14 01/23/2023    AST 33 01/23/2023    ALKPHOS 90 01/23/2023    BILITOT 0.3 01/23/2023     Lab Results   Component Value Date    TSH 1.01 12/19/2022     No results found for: LABA1C, HGBA1C    VITALS  Vitals:    02/16/23 1147   BP: (!) 120/58   Pulse: 65   Weight: 54 kg (119 lb)       PHYSICAL EXAM  General: appears stated age  Neurologic:   Gait:  slow and steady when walking, able to ambulate more using walker now  Psychomotor signs:  No involuntary movements, tics, or tremor noted on exam       PSYCHIATRIC EXAM:     Mental Status Exam:  Appearance: casually dressed, no acute distress, good hygiene and grooming, thin   Behavior/Cooperation:  calm and cooperative, anxious at times, redirectable   Speech: dysarthia, minimal unable to fully assess  Language:  History of aphasia able to utter 1 or 2 words  Mood:  Euthymic  Affect:  Improved no longer anxious or had distressed appearance.  Thought Process:  Somewhat blocked, and less perseverative, limited unable to fully assess  Thought Content: delusions: no, hallucinations: (auditory: no, visual: no, illusions: no), obsessions: no, suicidal thoughts: (active-no, passive-no homicidal thoughts: (active-no,  passive-no), ruminations, poverty of content  Level of Consciousness: Alert but not fully oriented however unable to test  Memory:  Impaired unable to fully test patient's family reports she is able to at times recall previous events however has poor recent memory   Attention/concentration:  Limited patient seems to be less distracted  Insight:  Limited into patient's dementia  Judgment:  Limited but redirectable      IMPRESSION:    Patient is a 87-year-old female with history of major neurocognitive disorder (dementia), unspecified anxiety, behavioral disturbances secondary to dementia, 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       Status/Progress:  Based on the examination today, the patient's problem(s) is/are improved.  New problems have not been presented today.     DIAGNOSES:  Major Neurocognitive Disorder  Prior CVA  Anxiety      PLAN:  Psych Med:  CONTINUE current med regimen   Lexapro 20 mg qday for anxiety  Geodon 20 mg q.h.s. for behavioral issues and sleep and ruminations - considering discontinuing or taking without meals in future to decrease bioavailability/attempt to taper off  Trazodone  mg q.h.s. for sleep - counseled family they can cut tablet in half if they feel pt is sleeping too much   Topamax 25 mg for likely mood stabilization however can consider potentially discontinuing in the future due to risk of polypharmacy.  Discontinued Ativan 0.5 mg as needed for severe anxiety or anxiety provoking situations such as leaving home and coming to a doctor's appointment. Has not taken in several months.   Ordered UA due to complaints of urinary incontinence, abdominal pain, and foul smelling urine    Discussed with patient and family informed consent, risks vs. benefits, alternative treatments, side effect profile and the inherent unpredictability of individual responses to  these treatments. Answered any questions they may have had.  They express understanding of the above and displays the capacity to agree with this current plan     Other: may need AIMS at next visit    RETURN TO CLINIC: 2-3 month      Marjan Shell MD  Department of Psychiatry   [facility]  2/16/2023

## 2023-02-27 ENCOUNTER — PATIENT MESSAGE (OUTPATIENT)
Dept: PSYCHIATRY | Facility: CLINIC | Age: 88
End: 2023-02-27
Payer: MEDICARE

## 2023-02-28 ENCOUNTER — PATIENT MESSAGE (OUTPATIENT)
Dept: PSYCHIATRY | Facility: CLINIC | Age: 88
End: 2023-02-28
Payer: MEDICARE

## 2023-03-09 DIAGNOSIS — G47.09 OTHER INSOMNIA: ICD-10-CM

## 2023-03-09 DIAGNOSIS — F29 PSYCHOSIS, UNSPECIFIED PSYCHOSIS TYPE: Primary | ICD-10-CM

## 2023-03-09 DIAGNOSIS — G30.1 ALZHEIMER'S DISEASE WITH LATE ONSET (CODE): ICD-10-CM

## 2023-03-09 PROBLEM — G47.00 INSOMNIA: Status: ACTIVE | Noted: 2023-03-09

## 2023-03-09 RX ORDER — ZIPRASIDONE HYDROCHLORIDE 20 MG/1
20 CAPSULE ORAL NIGHTLY
Qty: 30 CAPSULE | Refills: 3 | Status: SHIPPED | OUTPATIENT
Start: 2023-03-09 | End: 2023-05-02 | Stop reason: SDUPTHER

## 2023-03-09 RX ORDER — TRAZODONE HYDROCHLORIDE 50 MG/1
TABLET ORAL
Qty: 60 TABLET | Refills: 3 | Status: SHIPPED | OUTPATIENT
Start: 2023-03-09 | End: 2023-05-02 | Stop reason: SDUPTHER

## 2023-03-09 NOTE — PROGRESS NOTES
STAFF NOTE:  Patient's case was formally presented to me by Dr. Shell and patient was seen by me in supervision.  I agree with the assessment and plan as specified.       Vipul Frank MD  Psychiatry Staff   [facility]

## 2023-05-02 ENCOUNTER — CLINICAL SUPPORT (OUTPATIENT)
Dept: PSYCHIATRY | Facility: CLINIC | Age: 88
End: 2023-05-02
Payer: MEDICARE

## 2023-05-02 ENCOUNTER — HOSPITAL ENCOUNTER (OUTPATIENT)
Dept: CARDIOLOGY | Facility: CLINIC | Age: 88
Discharge: HOME | End: 2023-05-02
Payer: MEDICARE

## 2023-05-02 VITALS
WEIGHT: 121.81 LBS | DIASTOLIC BLOOD PRESSURE: 55 MMHG | BODY MASS INDEX: 23.01 KG/M2 | SYSTOLIC BLOOD PRESSURE: 98 MMHG | HEART RATE: 65 BPM

## 2023-05-02 DIAGNOSIS — F29 PSYCHOSIS, UNSPECIFIED PSYCHOSIS TYPE: ICD-10-CM

## 2023-05-02 DIAGNOSIS — F41.9 ANXIETY: Primary | ICD-10-CM

## 2023-05-02 DIAGNOSIS — G47.09 OTHER INSOMNIA: ICD-10-CM

## 2023-05-02 DIAGNOSIS — F41.9 ANXIETY: ICD-10-CM

## 2023-05-02 DIAGNOSIS — F03.90 MAJOR NEUROCOGNITIVE DISORDER: ICD-10-CM

## 2023-05-02 PROCEDURE — 93010 ELECTROCARDIOGRAM REPORT: CPT | Mod: S$PBB,,, | Performed by: INTERNAL MEDICINE

## 2023-05-02 PROCEDURE — 99214 OFFICE O/P EST MOD 30 MIN: CPT | Mod: S$PBB,,, | Performed by: PSYCHIATRY & NEUROLOGY

## 2023-05-02 PROCEDURE — 93005 ELECTROCARDIOGRAM TRACING: CPT | Mod: PBBFAC | Performed by: INTERNAL MEDICINE

## 2023-05-02 PROCEDURE — 99999 PR PBB SHADOW E&M-EST. PATIENT-LVL II: ICD-10-PCS | Mod: PBBFAC,,,

## 2023-05-02 PROCEDURE — 99999 PR PBB SHADOW E&M-EST. PATIENT-LVL II: CPT | Mod: PBBFAC,,,

## 2023-05-02 PROCEDURE — 99212 OFFICE O/P EST SF 10 MIN: CPT | Mod: PBBFAC

## 2023-05-02 PROCEDURE — 93010 EKG 12-LEAD: ICD-10-PCS | Mod: S$PBB,,, | Performed by: INTERNAL MEDICINE

## 2023-05-02 PROCEDURE — 99214 PR OFFICE/OUTPT VISIT, EST, LEVL IV, 30-39 MIN: ICD-10-PCS | Mod: S$PBB,,, | Performed by: PSYCHIATRY & NEUROLOGY

## 2023-05-02 RX ORDER — ZIPRASIDONE HYDROCHLORIDE 20 MG/1
20 CAPSULE ORAL NIGHTLY
Qty: 30 CAPSULE | Refills: 5 | Status: SHIPPED | OUTPATIENT
Start: 2023-05-02 | End: 2024-02-07

## 2023-05-02 RX ORDER — TRAZODONE HYDROCHLORIDE 50 MG/1
TABLET ORAL
Qty: 60 TABLET | Refills: 5 | Status: SHIPPED | OUTPATIENT
Start: 2023-05-02 | End: 2024-02-07

## 2023-05-02 RX ORDER — ESCITALOPRAM OXALATE 20 MG/1
20 TABLET ORAL DAILY
Qty: 30 TABLET | Refills: 5 | Status: SHIPPED | OUTPATIENT
Start: 2023-05-02 | End: 2023-10-29

## 2023-05-02 RX ORDER — TOPIRAMATE 25 MG/1
25 TABLET ORAL NIGHTLY
Qty: 30 TABLET | Refills: 5 | Status: SHIPPED | OUTPATIENT
Start: 2023-05-02 | End: 2023-11-09 | Stop reason: SDUPTHER

## 2023-05-02 NOTE — PROGRESS NOTES
"OUTPATIENT PSYCHIATRY FOLLOW UP VISIT    ENCOUNTER DATE:  5/2/2023  SITE:  [facility], [facility]  LENGTH OF SESSION:  45 minutes       CHIEF COMPLAINT:  Anxiety and Memory Deficits        HISTORY OF PRESENTING ILLNESS:  Aranza Briceño is a 87 y.o. 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      Interval history as told by Patient - patient, but mostly her two children and , Pablo    Family reports that patient has been "improving.  Continues to struggle with expressive aphasia, but  reports that patient has been saying 5-word phrases more consistently over the past few weeks and patient has been tending to dishes, eating more, dressing self, and peeling her own crawfish.  Family reflected on work with speech therapy work in the past.  Has been using walker "very cautiously" but able to get around without too much difficulty.  Patient continues to have intentional weight gain- family attributes this to either lexapro and CBD.  Patient is usually "ready to eat at noon and ready to eat at 5."  Also trying a "X39 patch."  Family voices that she has some urinary incontinence, but foul smelling urine resolved- still uses AZO supplements.  They deny episodes of irritability, combativeness, and hallucinations.  Patient's family voices that she does have trouble remembering names, but there is possible attribution to expression.   No sundowning appreciated by family.  Patient's family happy with benefit from SSRI and voices that it's better than Benzodiazepine.  Family also reflects on improvement in thoughts about death- completely resolved.  A family contact passed away 2 months ago- family reports "She had a rough day after that.  She notices " "things like that."    Early in the appointment, patient begins to be tearful because she is struggling to hear the conversation.  Family states that they are going to make an effort to bring her hearing aid more frequently.  Family committed to keeping appointments- saw neurology last week, has also seen PCP soon.  Patient continues to do puzzles- now up to 500 piece puzzles with assistance.  Continues to enjoy westerns.      Discussed medications- patient had significant anxiety in the past when discontinuing Topamax in the past.      PSYCHIATRIC REVIEW OF SYSTEMS:(none/ yes- better/worse/stable/& what symptoms)    Symptoms of Depression:  None reported     Symptoms of Anxiety/ panic attacks: Improved since increasing lexapro but continue to feels anxious when having difficulty expressing self    Symptoms of Thais/Hypomania:  Denies    Symptoms of psychosis:  Denied    Sleep:  Improved-sleeping 10 to 12 hours     Appetite:  Continues to improve    Alcohol:  None    Illicit Drugs: None    Risk Parameters:  Patient reports no suicidal ideation  Patient reports no homicidal ideation  Patient reports no self-injurious behavior  Patient reports no violent behavior    PSYCHIATRIC MED REVIEW    Current psych meds  Geodon 20 mg q.h.s.  Topamax 25 mg q.h.s.   Trazodone 100 mg q.h.s.  Lexapro 20 mg q.day     Medication side effects:  No major reported side effects   Medication compliance:  Yes patient gets pill packs and is monitored by family     Previous psych meds trials  Wellbutrin 150 mg b.i.d. that was initiated by patient's neurologist family believes this has helped her anxiety and depression however was discontinued due to worsening of sleep and likely some psychosis.  Ativan was discontinued due to BEERS criteria and risk of falls and further cognitive impairment however resumed only as a PRN for severe anxiety   Effexor ineffective   Memantine and Aricept had unknown side effects    PAST PSYCHIATRIC, MEDICAL, AND " SOCIAL HISTORY REVIEWED  The patient's past medical, family and social history have been reviewed and updated as appropriate within the electronic medical record - see encounter notes.    MEDICAL REVIEW OF SYSTEMS:  Complete review of systems performed covering Constitutional, Musculoskeletal, Neurologic.  All systems negative No falls or gait ataxia or any dizziness    ALL MEDICATIONS:    Current Outpatient Medications:     AZO CRANBERRY 250 mg Chew, 1 tablet  once a day, Disp: , Rfl:     cholecalciferol, vitamin D3, (VITAMIN D3) 25 mcg (1,000 unit) capsule, Take 1,000 Units by mouth once daily., Disp: , Rfl:     clopidogreL (PLAVIX) 75 mg tablet, Take 75 mg by mouth every morning., Disp: , Rfl:     EScitalopram oxalate (LEXAPRO) 20 MG tablet, Take 1 tablet (20 mg total) by mouth once daily., Disp: 30 tablet, Rfl: 11    GAVISCON EXTRA STRENGTH 254-237.5 mg/5 mL Susp, SMARTSI Teaspoon By Mouth 4 Times Daily, Disp: , Rfl:     iron/FA/dha/epa/FAD/NADH/mv47 (ENLYTE ORAL), Take by mouth. 1 tab by mouth daily, Disp: , Rfl:     LORazepam (ATIVAN) 0.5 MG tablet, ONLY AS NEEDED FOR SEVERE ANXIETY OR BEFORE ANY SITUATIONS THAT WILL CAUSE SEVERE ANXIETY (Patient not taking: Reported on 2023), Disp: 30 tablet, Rfl: 0    pantoprazole (PROTONIX) 40 MG tablet, Take 40 mg by mouth every morning., Disp: , Rfl:     topiramate (TOPAMAX) 25 MG tablet, Take 1 tablet (25 mg total) by mouth every evening., Disp: 30 tablet, Rfl: 1    traZODone (DESYREL) 50 MG tablet, TAKE TWO TABLETS BY MOUTH EVERY NIGHT, Disp: 60 tablet, Rfl: 3    UNABLE TO FIND, Barby TC one tablet by mouth nightly, Disp: , Rfl:     ziprasidone (GEODON) 20 MG Cap, Take 1 capsule (20 mg total) by mouth every evening., Disp: 30 capsule, Rfl: 3  No current facility-administered medications for this visit.    Facility-Administered Medications Ordered in Other Visits:     0.9%  NaCl infusion, , Intravenous, Continuous, Doroteo Huang MD, Stopped at 20  0944    ALLERGIES:  Review of patient's allergies indicates:   Allergen Reactions    Cephalosporins     Clonidine     Metronidazole     Morphine     Penicillins     Percocet [oxycodone-acetaminophen]     Simvastatin     Sulfa (sulfonamide antibiotics)     Tetracyclines     Trimethoprim        RELEVANT LABS/STUDIES:    Lab Results   Component Value Date    WBC 6.00 01/23/2023    HGB 14.0 01/23/2023    HCT 41.0 01/23/2023    MCV 92 01/23/2023     (L) 01/23/2023     BMP  Lab Results   Component Value Date     (L) 01/23/2023    K 4.9 01/23/2023     01/23/2023    CO2 29 01/23/2023    BUN 33 (H) 01/23/2023    CREATININE 1.02 01/23/2023    CALCIUM 9.3 01/23/2023    ANIONGAP 4 (L) 01/23/2023    ESTGFRAFRICA >60 01/19/2021    EGFRNONAA 56 (A) 01/19/2021     Lab Results   Component Value Date    ALT 14 01/23/2023    AST 33 01/23/2023    ALKPHOS 90 01/23/2023    BILITOT 0.3 01/23/2023     Lab Results   Component Value Date    TSH 1.01 12/19/2022     No results found for: LABA1C, HGBA1C    VITALS  There were no vitals filed for this visit.      PHYSICAL EXAM  General: appears stated age  Neurologic:   Gait:  wheelchair during today's evaluation  Psychomotor signs:  No involuntary movements, tics, or tremor noted on exam       PSYCHIATRIC EXAM:     Mental Status Exam:  Appearance: casually dressed, no acute distress, good hygiene and grooming, thin   Behavior/Cooperation:  calm and cooperative, anxious at times, redirectable   Speech: dysarthia, minimal unable to fully assess  Language:  History of aphasia able to utter 4-5 words  Mood:  Euthymic  Affect:  Briefly tearful, but mostly appropriate  Thought Process:  Somewhat blocked, and less perseverative, limited unable to fully assess  Thought Content: delusions: no, hallucinations: (auditory: no, visual: no, illusions: no), obsessions: no, suicidal thoughts: (active-no, passive-no homicidal thoughts: (active-no, passive-no), ruminations, poverty of  content  Level of Consciousness: Alert but not fully oriented however unable to test  Memory:  Impaired unable to fully test patient's family reports she is able to at times recall previous events however has poor recent memory   Attention/concentration:  Limited patient seems to be less distracted  Insight:  Limited into patient's dementia  Judgment:  Limited but redirectable        AIMS: Score 0/36  Abnormal Involuntary Movement Scale  0-4   Muscles of Facial Expression  0   Lips and Perioral Area  0   Jaw  0   Tongue  0   Upper (arms, wrists, hands, fingers)  0    Lower (legs, knees, ankles, toes)  0   Neck, shoulders, hips  0   Severity of abnormal movements (highest score from questions above)  0    Incapacitation due to abnormal movements  0    Patient's awareness of abnormal movements (rate only patient's report)  0   Current problems with teeth and/or dentures?  No    Does patient usually wear dentures?  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       Status/Progress:  Based on the examination today, the patient's problem(s) is/are improved.  New problems have not been presented today.     DIAGNOSES:  Major Neurocognitive Disorder  Prior CVA  Anxiety      PLAN:  Psych Med:  CONTINUE current med regimen   Lexapro 20 mg qday for anxiety  Geodon 20 mg q.h.s. for behavioral issues and sleep and ruminations - considering discontinuing or taking without meals in future to decrease bioavailability/attempt to taper off  Will order EKG (May 2, 2023)  Trazodone  mg q.h.s. for sleep - counseled family they can cut tablet in half if they feel pt is sleeping too much   Topamax 25 mg for likely mood stabilization  however can consider potentially discontinuing in the future due to risk of polypharmacy.  Have also recommended patient to wearing Hearing aid more (05/02/2023)        Discussed with patient and family informed consent, risks vs. benefits, alternative treatments, side effect profile and the inherent unpredictability of individual responses to these treatments. Answered any questions they may have had.  They express understanding of the above and displays the capacity to agree with this current plan       RETURN TO CLINIC:6 month    Cira Yoon MD  [facility] Psychiatry PGY-4  5/2/2023

## 2023-05-03 ENCOUNTER — PATIENT MESSAGE (OUTPATIENT)
Dept: PSYCHIATRY | Facility: CLINIC | Age: 88
End: 2023-05-03
Payer: MEDICARE

## 2023-12-07 NOTE — PROGRESS NOTES
Staff Note:     Pt interviewed and case discussed.  I agree with Resident's findings and treatment plan.  Family reports substantial reduction of depression, though Pt remains frustrated by aphasia.    TI

## 2024-02-07 DIAGNOSIS — F29 PSYCHOSIS, UNSPECIFIED PSYCHOSIS TYPE: ICD-10-CM

## 2024-02-07 DIAGNOSIS — G47.09 OTHER INSOMNIA: ICD-10-CM

## 2024-02-07 RX ORDER — ZIPRASIDONE HYDROCHLORIDE 20 MG/1
20 CAPSULE ORAL NIGHTLY
Qty: 30 CAPSULE | Refills: 5 | Status: SHIPPED | OUTPATIENT
Start: 2024-02-07

## 2024-02-07 RX ORDER — TRAZODONE HYDROCHLORIDE 50 MG/1
TABLET ORAL
Qty: 60 TABLET | Refills: 5 | Status: SHIPPED | OUTPATIENT
Start: 2024-02-07

## 2024-06-28 DIAGNOSIS — F29 PSYCHOSIS, UNSPECIFIED PSYCHOSIS TYPE: ICD-10-CM

## 2024-06-28 DIAGNOSIS — G47.09 OTHER INSOMNIA: ICD-10-CM

## 2024-07-01 RX ORDER — TRAZODONE HYDROCHLORIDE 50 MG/1
TABLET ORAL
Qty: 60 TABLET | Refills: 5 | OUTPATIENT
Start: 2024-07-01

## 2024-07-01 RX ORDER — ZIPRASIDONE HYDROCHLORIDE 20 MG/1
20 CAPSULE ORAL NIGHTLY
Qty: 30 CAPSULE | Refills: 5 | OUTPATIENT
Start: 2024-07-01